# Patient Record
Sex: FEMALE | Race: WHITE | NOT HISPANIC OR LATINO
[De-identification: names, ages, dates, MRNs, and addresses within clinical notes are randomized per-mention and may not be internally consistent; named-entity substitution may affect disease eponyms.]

---

## 2017-01-09 ENCOUNTER — MEDICATION RENEWAL (OUTPATIENT)
Age: 62
End: 2017-01-09

## 2017-01-09 ENCOUNTER — APPOINTMENT (OUTPATIENT)
Dept: FAMILY MEDICINE | Facility: CLINIC | Age: 62
End: 2017-01-09

## 2017-01-09 VITALS
WEIGHT: 136 LBS | HEIGHT: 63 IN | TEMPERATURE: 98.5 F | HEART RATE: 97 BPM | BODY MASS INDEX: 24.1 KG/M2 | DIASTOLIC BLOOD PRESSURE: 80 MMHG | SYSTOLIC BLOOD PRESSURE: 134 MMHG

## 2017-01-09 DIAGNOSIS — Z87.01 PERSONAL HISTORY OF PNEUMONIA (RECURRENT): ICD-10-CM

## 2017-01-10 LAB
25(OH)D3 SERPL-MCNC: 45.8 NG/ML
ALBUMIN SERPL ELPH-MCNC: 4 G/DL
ALP BLD-CCNC: 67 U/L
ALT SERPL-CCNC: 32 U/L
ANION GAP SERPL CALC-SCNC: 15 MMOL/L
AST SERPL-CCNC: 26 U/L
BASOPHILS # BLD AUTO: 0.03 K/UL
BASOPHILS NFR BLD AUTO: 0.3 %
BILIRUB SERPL-MCNC: 0.4 MG/DL
BUN SERPL-MCNC: 13 MG/DL
CALCIUM SERPL-MCNC: 9.1 MG/DL
CHLORIDE SERPL-SCNC: 101 MMOL/L
CHOLEST SERPL-MCNC: 259 MG/DL
CHOLEST/HDLC SERPL: 2.3 RATIO
CO2 SERPL-SCNC: 24 MMOL/L
CREAT SERPL-MCNC: 0.83 MG/DL
EOSINOPHIL # BLD AUTO: 0.04 K/UL
EOSINOPHIL NFR BLD AUTO: 0.5 %
GGT SERPL-CCNC: 12 U/L
GLUCOSE SERPL-MCNC: 91 MG/DL
HBA1C MFR BLD HPLC: 5.7 %
HCT VFR BLD CALC: 38.9 %
HDLC SERPL-MCNC: 114 MG/DL
HGB BLD-MCNC: 13.1 G/DL
IMM GRANULOCYTES NFR BLD AUTO: 0.1 %
LDLC SERPL CALC-MCNC: 135 MG/DL
LYMPHOCYTES # BLD AUTO: 1.45 K/UL
LYMPHOCYTES NFR BLD AUTO: 16.5 %
MAN DIFF?: NORMAL
MCHC RBC-ENTMCNC: 30.3 PG
MCHC RBC-ENTMCNC: 33.7 GM/DL
MCV RBC AUTO: 89.8 FL
MONOCYTES # BLD AUTO: 0.59 K/UL
MONOCYTES NFR BLD AUTO: 6.7 %
NEUTROPHILS # BLD AUTO: 6.65 K/UL
NEUTROPHILS NFR BLD AUTO: 75.9 %
PLATELET # BLD AUTO: 201 K/UL
POTASSIUM SERPL-SCNC: 4.1 MMOL/L
PROT SERPL-MCNC: 7.2 G/DL
RBC # BLD: 4.33 M/UL
RBC # FLD: 13.1 %
SODIUM SERPL-SCNC: 140 MMOL/L
T3 SERPL-MCNC: 85 NG/DL
T4 SERPL-MCNC: 6.4 UG/DL
TRIGL SERPL-MCNC: 49 MG/DL
TSH SERPL-ACNC: 3.19 UU/ML
URATE SERPL-MCNC: 4.1 MG/DL
WBC # FLD AUTO: 8.77 K/UL

## 2017-01-16 ENCOUNTER — APPOINTMENT (OUTPATIENT)
Dept: FAMILY MEDICINE | Facility: CLINIC | Age: 62
End: 2017-01-16

## 2017-01-16 VITALS
HEART RATE: 96 BPM | WEIGHT: 136 LBS | SYSTOLIC BLOOD PRESSURE: 130 MMHG | HEIGHT: 63 IN | TEMPERATURE: 98.3 F | DIASTOLIC BLOOD PRESSURE: 80 MMHG | BODY MASS INDEX: 24.1 KG/M2

## 2017-02-01 ENCOUNTER — APPOINTMENT (OUTPATIENT)
Dept: FAMILY MEDICINE | Facility: CLINIC | Age: 62
End: 2017-02-01

## 2017-02-01 VITALS
OXYGEN SATURATION: 98 % | TEMPERATURE: 98.3 F | WEIGHT: 139 LBS | HEIGHT: 63 IN | BODY MASS INDEX: 24.63 KG/M2 | SYSTOLIC BLOOD PRESSURE: 130 MMHG | HEART RATE: 84 BPM | DIASTOLIC BLOOD PRESSURE: 80 MMHG

## 2017-02-01 RX ORDER — CLARITHROMYCIN 500 MG/1
500 TABLET, FILM COATED ORAL
Qty: 20 | Refills: 0 | Status: COMPLETED | COMMUNITY
Start: 2017-01-09 | End: 2017-02-01

## 2017-02-16 ENCOUNTER — APPOINTMENT (OUTPATIENT)
Dept: FAMILY MEDICINE | Facility: CLINIC | Age: 62
End: 2017-02-16

## 2017-02-16 VITALS
HEART RATE: 100 BPM | HEIGHT: 63 IN | DIASTOLIC BLOOD PRESSURE: 70 MMHG | SYSTOLIC BLOOD PRESSURE: 122 MMHG | WEIGHT: 135 LBS | BODY MASS INDEX: 23.92 KG/M2

## 2017-02-16 RX ORDER — AMOXICILLIN 500 MG/1
500 CAPSULE ORAL
Qty: 7 | Refills: 0 | Status: DISCONTINUED | COMMUNITY
Start: 2017-02-01 | End: 2017-02-16

## 2017-02-27 ENCOUNTER — RX RENEWAL (OUTPATIENT)
Age: 62
End: 2017-02-27

## 2017-03-14 PROBLEM — Z87.898 HISTORY OF TACHYCARDIA: Status: ACTIVE | Noted: 2017-01-09

## 2017-03-20 ENCOUNTER — RX RENEWAL (OUTPATIENT)
Age: 62
End: 2017-03-20

## 2017-03-29 ENCOUNTER — RESULT REVIEW (OUTPATIENT)
Age: 62
End: 2017-03-29

## 2017-05-04 ENCOUNTER — OUTPATIENT (OUTPATIENT)
Dept: OUTPATIENT SERVICES | Facility: HOSPITAL | Age: 62
LOS: 1 days | End: 2017-05-04
Payer: COMMERCIAL

## 2017-05-04 ENCOUNTER — APPOINTMENT (OUTPATIENT)
Dept: MAMMOGRAPHY | Facility: CLINIC | Age: 62
End: 2017-05-04

## 2017-05-04 ENCOUNTER — APPOINTMENT (OUTPATIENT)
Dept: ULTRASOUND IMAGING | Facility: CLINIC | Age: 62
End: 2017-05-04

## 2017-05-04 DIAGNOSIS — Z00.8 ENCOUNTER FOR OTHER GENERAL EXAMINATION: ICD-10-CM

## 2017-05-04 PROCEDURE — 77063 BREAST TOMOSYNTHESIS BI: CPT

## 2017-05-04 PROCEDURE — 76641 ULTRASOUND BREAST COMPLETE: CPT

## 2017-05-04 PROCEDURE — 77067 SCR MAMMO BI INCL CAD: CPT

## 2017-05-11 DIAGNOSIS — Z12.31 ENCOUNTER FOR SCREENING MAMMOGRAM FOR MALIGNANT NEOPLASM OF BREAST: ICD-10-CM

## 2017-05-11 DIAGNOSIS — R92.2 INCONCLUSIVE MAMMOGRAM: ICD-10-CM

## 2017-05-11 DIAGNOSIS — Z80.3 FAMILY HISTORY OF MALIGNANT NEOPLASM OF BREAST: ICD-10-CM

## 2017-07-25 ENCOUNTER — MEDICATION RENEWAL (OUTPATIENT)
Age: 62
End: 2017-07-25

## 2017-07-28 ENCOUNTER — RX RENEWAL (OUTPATIENT)
Age: 62
End: 2017-07-28

## 2017-08-17 ENCOUNTER — APPOINTMENT (OUTPATIENT)
Dept: FAMILY MEDICINE | Facility: CLINIC | Age: 62
End: 2017-08-17
Payer: COMMERCIAL

## 2017-08-17 VITALS
SYSTOLIC BLOOD PRESSURE: 120 MMHG | DIASTOLIC BLOOD PRESSURE: 72 MMHG | BODY MASS INDEX: 24.27 KG/M2 | HEIGHT: 63 IN | WEIGHT: 137 LBS | TEMPERATURE: 98.2 F

## 2017-08-17 PROCEDURE — 99214 OFFICE O/P EST MOD 30 MIN: CPT

## 2017-08-17 RX ORDER — AZITHROMYCIN 500 MG/1
500 TABLET, FILM COATED ORAL DAILY
Qty: 7 | Refills: 0 | Status: DISCONTINUED | COMMUNITY
Start: 2017-02-01 | End: 2017-08-17

## 2017-08-17 RX ORDER — LEVOCETIRIZINE DIHYDROCHLORIDE 5 MG/1
5 TABLET ORAL DAILY
Qty: 30 | Refills: 2 | Status: COMPLETED | COMMUNITY
Start: 2017-02-01 | End: 2017-08-17

## 2017-08-28 ENCOUNTER — APPOINTMENT (OUTPATIENT)
Dept: FAMILY MEDICINE | Facility: CLINIC | Age: 62
End: 2017-08-28
Payer: COMMERCIAL

## 2017-08-28 VITALS
HEART RATE: 96 BPM | BODY MASS INDEX: 24.1 KG/M2 | OXYGEN SATURATION: 96 % | HEIGHT: 63 IN | DIASTOLIC BLOOD PRESSURE: 90 MMHG | SYSTOLIC BLOOD PRESSURE: 134 MMHG | TEMPERATURE: 98.7 F | WEIGHT: 136 LBS

## 2017-08-28 PROCEDURE — 99214 OFFICE O/P EST MOD 30 MIN: CPT

## 2017-12-13 ENCOUNTER — MEDICATION RENEWAL (OUTPATIENT)
Age: 62
End: 2017-12-13

## 2018-03-23 ENCOUNTER — APPOINTMENT (OUTPATIENT)
Dept: FAMILY MEDICINE | Facility: CLINIC | Age: 63
End: 2018-03-23
Payer: COMMERCIAL

## 2018-03-23 VITALS
HEIGHT: 63 IN | SYSTOLIC BLOOD PRESSURE: 122 MMHG | HEART RATE: 88 BPM | OXYGEN SATURATION: 97 % | DIASTOLIC BLOOD PRESSURE: 72 MMHG | BODY MASS INDEX: 24.1 KG/M2 | WEIGHT: 136 LBS | TEMPERATURE: 98.5 F

## 2018-03-23 PROCEDURE — 99214 OFFICE O/P EST MOD 30 MIN: CPT

## 2018-03-23 RX ORDER — SULFAMETHOXAZOLE AND TRIMETHOPRIM 800; 160 MG/1; MG/1
800-160 TABLET ORAL TWICE DAILY
Qty: 20 | Refills: 0 | Status: DISCONTINUED | COMMUNITY
Start: 2017-08-29 | End: 2018-03-23

## 2018-03-23 RX ORDER — CLARITHROMYCIN 500 MG/1
500 TABLET, FILM COATED ORAL
Qty: 20 | Refills: 0 | Status: DISCONTINUED | COMMUNITY
Start: 2017-08-17 | End: 2018-03-23

## 2018-03-23 RX ORDER — METHYLPREDNISOLONE 4 MG/1
4 TABLET ORAL
Qty: 21 | Refills: 0 | Status: DISCONTINUED | COMMUNITY
Start: 2017-08-29 | End: 2018-03-23

## 2018-03-23 RX ORDER — IPRATROPIUM BROMIDE 42 UG/1
0.06 SPRAY NASAL
Qty: 15 | Refills: 0 | Status: COMPLETED | COMMUNITY
Start: 2018-01-11

## 2018-03-23 RX ORDER — LEVOCETIRIZINE DIHYDROCHLORIDE 5 MG/1
5 TABLET ORAL DAILY
Qty: 30 | Refills: 5 | Status: COMPLETED | COMMUNITY
Start: 2017-08-17 | End: 2018-03-23

## 2018-03-23 RX ORDER — DOXYCYCLINE 100 MG/1
100 CAPSULE ORAL
Qty: 20 | Refills: 0 | Status: COMPLETED | COMMUNITY
Start: 2018-01-13

## 2018-03-30 ENCOUNTER — NON-APPOINTMENT (OUTPATIENT)
Age: 63
End: 2018-03-30

## 2018-03-30 ENCOUNTER — OUTPATIENT (OUTPATIENT)
Dept: OUTPATIENT SERVICES | Facility: HOSPITAL | Age: 63
LOS: 1 days | End: 2018-03-30
Payer: COMMERCIAL

## 2018-03-30 ENCOUNTER — APPOINTMENT (OUTPATIENT)
Dept: FAMILY MEDICINE | Facility: CLINIC | Age: 63
End: 2018-03-30
Payer: COMMERCIAL

## 2018-03-30 ENCOUNTER — APPOINTMENT (OUTPATIENT)
Dept: RADIOLOGY | Facility: CLINIC | Age: 63
End: 2018-03-30
Payer: COMMERCIAL

## 2018-03-30 VITALS
SYSTOLIC BLOOD PRESSURE: 120 MMHG | WEIGHT: 133 LBS | BODY MASS INDEX: 23.57 KG/M2 | OXYGEN SATURATION: 98 % | DIASTOLIC BLOOD PRESSURE: 76 MMHG | TEMPERATURE: 97.9 F | HEART RATE: 101 BPM | HEIGHT: 63 IN

## 2018-03-30 DIAGNOSIS — Z00.8 ENCOUNTER FOR OTHER GENERAL EXAMINATION: ICD-10-CM

## 2018-03-30 PROCEDURE — 99214 OFFICE O/P EST MOD 30 MIN: CPT

## 2018-03-30 PROCEDURE — 71046 X-RAY EXAM CHEST 2 VIEWS: CPT

## 2018-03-30 PROCEDURE — 71046 X-RAY EXAM CHEST 2 VIEWS: CPT | Mod: 26

## 2018-04-03 ENCOUNTER — RESULT REVIEW (OUTPATIENT)
Age: 63
End: 2018-04-03

## 2018-04-19 ENCOUNTER — RX RENEWAL (OUTPATIENT)
Age: 63
End: 2018-04-19

## 2018-04-25 ENCOUNTER — RX RENEWAL (OUTPATIENT)
Age: 63
End: 2018-04-25

## 2018-05-11 ENCOUNTER — APPOINTMENT (OUTPATIENT)
Dept: MAMMOGRAPHY | Facility: CLINIC | Age: 63
End: 2018-05-11
Payer: COMMERCIAL

## 2018-05-11 ENCOUNTER — OUTPATIENT (OUTPATIENT)
Dept: OUTPATIENT SERVICES | Facility: HOSPITAL | Age: 63
LOS: 1 days | End: 2018-05-11
Payer: COMMERCIAL

## 2018-05-11 ENCOUNTER — APPOINTMENT (OUTPATIENT)
Dept: ULTRASOUND IMAGING | Facility: CLINIC | Age: 63
End: 2018-05-11
Payer: COMMERCIAL

## 2018-05-11 DIAGNOSIS — Z00.8 ENCOUNTER FOR OTHER GENERAL EXAMINATION: ICD-10-CM

## 2018-05-11 PROCEDURE — 76641 ULTRASOUND BREAST COMPLETE: CPT

## 2018-05-11 PROCEDURE — 76641 ULTRASOUND BREAST COMPLETE: CPT | Mod: 26,50

## 2018-05-11 PROCEDURE — 77063 BREAST TOMOSYNTHESIS BI: CPT

## 2018-05-11 PROCEDURE — 77067 SCR MAMMO BI INCL CAD: CPT

## 2018-05-11 PROCEDURE — 77063 BREAST TOMOSYNTHESIS BI: CPT | Mod: 26

## 2018-05-11 PROCEDURE — 77067 SCR MAMMO BI INCL CAD: CPT | Mod: 26

## 2018-06-11 ENCOUNTER — APPOINTMENT (OUTPATIENT)
Dept: FAMILY MEDICINE | Facility: CLINIC | Age: 63
End: 2018-06-11
Payer: COMMERCIAL

## 2018-06-11 ENCOUNTER — OTHER (OUTPATIENT)
Age: 63
End: 2018-06-11

## 2018-06-11 PROCEDURE — 99214 OFFICE O/P EST MOD 30 MIN: CPT

## 2018-06-11 RX ORDER — DOXYCYCLINE HYCLATE 100 MG/1
100 CAPSULE ORAL TWICE DAILY
Qty: 20 | Refills: 0 | Status: COMPLETED | COMMUNITY
Start: 2018-03-30 | End: 2018-06-11

## 2018-06-11 RX ORDER — METHYLPREDNISOLONE 4 MG/1
4 TABLET ORAL
Qty: 1 | Refills: 0 | Status: COMPLETED | COMMUNITY
Start: 2018-03-30 | End: 2018-06-11

## 2018-06-11 RX ORDER — SODIUM PICOSULFATE, MAGNESIUM OXIDE, AND ANHYDROUS CITRIC ACID 10; 3.5; 12 MG/160ML; G/160ML; G/160ML
10-3.5-12 MG-GM LIQUID ORAL
Qty: 320 | Refills: 0 | Status: COMPLETED | COMMUNITY
Start: 2018-04-04

## 2018-06-12 ENCOUNTER — RESULT CHARGE (OUTPATIENT)
Age: 63
End: 2018-06-12

## 2018-06-12 VITALS — DIASTOLIC BLOOD PRESSURE: 70 MMHG | SYSTOLIC BLOOD PRESSURE: 122 MMHG | HEART RATE: 72 BPM

## 2018-06-12 NOTE — HISTORY OF PRESENT ILLNESS
[FreeTextEntry8] : 61 yo female c nearly 2 week hx of persistent cough.  pt states went to [facility] twice over this time period, last visit was on 6/5/18\par rx'ed Z-chato x 1,   Benzonate 100mg prn, and Prednisone 60mg  x 1 day and then 40mg days 2-5.  \par \par pt reports DECREASED productive cough now c yellow phlegm, originally green.  pt states cough is persistent.  denies any worsening at night.   no f/c/s  mild SOB,  +chest tightness and burning

## 2018-06-12 NOTE — PHYSICAL EXAM
[No Acute Distress] : no acute distress [Well Developed] : well developed [Well-Appearing] : well-appearing [EOMI] : extraocular movements intact [No JVD] : no jugular venous distention [Supple] : supple [No Lymphadenopathy] : no lymphadenopathy [Normal Rate] : normal rate  [Regular Rhythm] : with a regular rhythm [Normal S1, S2] : normal S1 and S2 [Normal Posterior Cervical Nodes] : no posterior cervical lymphadenopathy [Normal Anterior Cervical Nodes] : no anterior cervical lymphadenopathy [No CVA Tenderness] : no CVA  tenderness [No Spinal Tenderness] : no spinal tenderness [Grossly Normal Strength/Tone] : grossly normal strength/tone [No Rash] : no rash [Coordination Grossly Intact] : coordination grossly intact [No Focal Deficits] : no focal deficits [Normal Affect] : the affect was normal [Normal Mood] : the mood was normal [Normal Insight/Judgement] : insight and judgment were intact [de-identified] : mild PND w/o erythema post pharynx minimally dull TMs B  [de-identified] : decreased clear BS B /L

## 2018-06-12 NOTE — REVIEW OF SYSTEMS
[Fatigue] : fatigue [Postnasal Drip] : postnasal drip [Shortness Of Breath] : shortness of breath [Cough] : cough [Negative] : Heme/Lymph [FreeTextEntry2] : see HPI [FreeTextEntry4] : see HPI [FreeTextEntry6] : see HPI

## 2018-06-12 NOTE — ASSESSMENT
[FreeTextEntry1] : cont all meds  \par \par see med orders \par \par aggressive hydration!!!!!!\par \par \par f/u 72 hrs c phone call

## 2018-06-22 ENCOUNTER — APPOINTMENT (OUTPATIENT)
Dept: FAMILY MEDICINE | Facility: CLINIC | Age: 63
End: 2018-06-22
Payer: COMMERCIAL

## 2018-06-22 VITALS
HEIGHT: 63 IN | BODY MASS INDEX: 23.74 KG/M2 | WEIGHT: 134 LBS | HEART RATE: 94 BPM | OXYGEN SATURATION: 96 % | DIASTOLIC BLOOD PRESSURE: 72 MMHG | SYSTOLIC BLOOD PRESSURE: 112 MMHG

## 2018-06-22 PROCEDURE — 99214 OFFICE O/P EST MOD 30 MIN: CPT

## 2018-06-22 RX ORDER — BENZONATATE 100 MG/1
100 CAPSULE ORAL
Qty: 30 | Refills: 0 | Status: COMPLETED | COMMUNITY
Start: 2018-03-30 | End: 2018-06-22

## 2018-06-22 RX ORDER — METHYLPREDNISOLONE 4 MG/1
4 TABLET ORAL
Qty: 21 | Refills: 0 | Status: COMPLETED | COMMUNITY
Start: 2018-06-11 | End: 2018-06-22

## 2018-06-22 NOTE — PHYSICAL EXAM
[No Acute Distress] : no acute distress [Well Nourished] : well nourished [Well Developed] : well developed [Well-Appearing] : well-appearing [PERRL] : pupils equal round and reactive to light [EOMI] : extraocular movements intact [No JVD] : no jugular venous distention [Supple] : supple [No Lymphadenopathy] : no lymphadenopathy [No Respiratory Distress] : no respiratory distress  [Clear to Auscultation] : lungs were clear to auscultation bilaterally [No Accessory Muscle Use] : no accessory muscle use [Normal Rate] : normal rate  [Regular Rhythm] : with a regular rhythm [Normal S1, S2] : normal S1 and S2 [Soft] : abdomen soft [Non Tender] : non-tender [Non-distended] : non-distended [No HSM] : no HSM [Normal Posterior Cervical Nodes] : no posterior cervical lymphadenopathy [Normal Anterior Cervical Nodes] : no anterior cervical lymphadenopathy [No Spinal Tenderness] : no spinal tenderness [Grossly Normal Strength/Tone] : grossly normal strength/tone [No Rash] : no rash [Normal Gait] : normal gait [Coordination Grossly Intact] : coordination grossly intact [No Focal Deficits] : no focal deficits [Normal Affect] : the affect was normal [Normal Mood] : the mood was normal [Normal Insight/Judgement] : insight and judgment were intact [de-identified] : +PND post pharynx, dull TMs B

## 2018-06-22 NOTE — ASSESSMENT
[FreeTextEntry1] : cont Singulair, cont Flonase, cont Astepro cont Spiriva 2 puffs daily \par \par trial of Symbicort 160/4.5 2 puffs bid \par \par see radiology orders

## 2018-06-22 NOTE — HISTORY OF PRESENT ILLNESS
[FreeTextEntry1] : Patient here for follow up on cough and congestion. Patient c/o headache X 5 days  [de-identified] : 63 yo female presents to office for f/u on  cough s/p MDP x 1 and Promethazine 1 tsp q 6h.  pt states felt better on Day 2 of MDP,  however, symptoms returned 2 days post completion of MDP x 1.  Sx and Sx decreased in frequency and intensity, but persistent.\par \par no fever/chills/sweats  +B/L temporal H/a.  +mild nasal congestion c "bright yellow" mucous x 3 , +PND  no ear pain B/L

## 2018-06-22 NOTE — REVIEW OF SYSTEMS
[Fatigue] : fatigue [Cough] : cough [Negative] : Heme/Lymph [FreeTextEntry2] : see HPI  [FreeTextEntry6] : see HPI

## 2018-06-25 ENCOUNTER — MEDICATION RENEWAL (OUTPATIENT)
Age: 63
End: 2018-06-25

## 2018-07-05 ENCOUNTER — MEDICATION RENEWAL (OUTPATIENT)
Age: 63
End: 2018-07-05

## 2018-07-20 ENCOUNTER — APPOINTMENT (OUTPATIENT)
Dept: FAMILY MEDICINE | Facility: CLINIC | Age: 63
End: 2018-07-20
Payer: COMMERCIAL

## 2018-07-20 VITALS
HEIGHT: 63 IN | OXYGEN SATURATION: 98 % | SYSTOLIC BLOOD PRESSURE: 112 MMHG | DIASTOLIC BLOOD PRESSURE: 80 MMHG | HEART RATE: 96 BPM | WEIGHT: 134 LBS | TEMPERATURE: 98.2 F | BODY MASS INDEX: 23.74 KG/M2

## 2018-07-20 DIAGNOSIS — R91.8 OTHER NONSPECIFIC ABNORMAL FINDING OF LUNG FIELD: ICD-10-CM

## 2018-07-20 PROCEDURE — 99214 OFFICE O/P EST MOD 30 MIN: CPT

## 2018-07-20 RX ORDER — LEVOFLOXACIN 500 MG/1
500 TABLET, FILM COATED ORAL DAILY
Qty: 10 | Refills: 0 | Status: DISCONTINUED | COMMUNITY
Start: 2018-06-25 | End: 2018-07-20

## 2018-10-08 ENCOUNTER — APPOINTMENT (OUTPATIENT)
Dept: FAMILY MEDICINE | Facility: CLINIC | Age: 63
End: 2018-10-08
Payer: COMMERCIAL

## 2018-10-08 VITALS
TEMPERATURE: 98.4 F | SYSTOLIC BLOOD PRESSURE: 134 MMHG | HEIGHT: 63 IN | HEART RATE: 107 BPM | BODY MASS INDEX: 23.39 KG/M2 | WEIGHT: 132 LBS | DIASTOLIC BLOOD PRESSURE: 82 MMHG | OXYGEN SATURATION: 98 %

## 2018-10-08 DIAGNOSIS — Z87.09 PERSONAL HISTORY OF OTHER DISEASES OF THE RESPIRATORY SYSTEM: ICD-10-CM

## 2018-10-08 DIAGNOSIS — Z87.898 PERSONAL HISTORY OF OTHER SPECIFIED CONDITIONS: ICD-10-CM

## 2018-10-08 DIAGNOSIS — T75.3XXA MOTION SICKNESS, INITIAL ENCOUNTER: ICD-10-CM

## 2018-10-08 PROCEDURE — 99396 PREV VISIT EST AGE 40-64: CPT | Mod: 25

## 2018-10-08 PROCEDURE — G0008: CPT

## 2018-10-08 PROCEDURE — 90686 IIV4 VACC NO PRSV 0.5 ML IM: CPT

## 2018-10-08 PROCEDURE — 36415 COLL VENOUS BLD VENIPUNCTURE: CPT

## 2018-10-08 PROCEDURE — 93000 ELECTROCARDIOGRAM COMPLETE: CPT

## 2018-10-08 RX ORDER — PROMETHAZINE HYDROCHLORIDE AND DEXTROMETHORPHAN HYDROBROMIDE ORAL SOLUTION 15; 6.25 MG/5ML; MG/5ML
6.25-15 SOLUTION ORAL
Qty: 180 | Refills: 0 | Status: DISCONTINUED | COMMUNITY
Start: 2018-06-11 | End: 2018-10-08

## 2018-10-08 RX ORDER — BUDESONIDE AND FORMOTEROL FUMARATE DIHYDRATE 160; 4.5 UG/1; UG/1
160-4.5 AEROSOL RESPIRATORY (INHALATION) TWICE DAILY
Qty: 1 | Refills: 3 | Status: DISCONTINUED | COMMUNITY
Start: 2018-06-22 | End: 2018-10-08

## 2018-10-08 RX ORDER — MOXIFLOXACIN HYDROCHLORIDE TABLETS, 400 MG 400 MG/1
400 TABLET, FILM COATED ORAL DAILY
Qty: 10 | Refills: 0 | Status: DISCONTINUED | COMMUNITY
Start: 2018-07-20 | End: 2018-10-08

## 2018-10-08 NOTE — HEALTH RISK ASSESSMENT
[Patient reported mammogram was normal] : Patient reported mammogram was normal [Patient reported PAP Smear was normal] : Patient reported PAP Smear was normal [Patient reported bone density results were normal] : Patient reported bone density results were normal [Patient reported colonoscopy was normal] : Patient reported colonoscopy was normal [MammogramDate] : 05/2018 [MammogramComments] : Tressa Otero [PapSmearDate] : 03/2018 [PapSmearComments] : Dr. Pedro [BoneDensityDate] : 04/2016 [BoneDensityComments] : [facility] [ColonoscopyDate] : 10/2013 [ColonoscopyComments] : Dr. Nicole

## 2018-10-08 NOTE — PLAN
[FreeTextEntry1] : again recommend ent consult for chronic sinus disease\par patient to ask allergist for recommendation

## 2018-10-08 NOTE — PHYSICAL EXAM

## 2018-10-08 NOTE — HISTORY OF PRESENT ILLNESS
[FreeTextEntry1] : CPE [de-identified] : has lingular pneumonia in early summer\par treated with antibiotics but had persistent fever\par in july there was no resulotion on xray so had ct scan which showed pneumonia (mulitple spots)\par was at admitted to [facility] under Dr. Ramsey and was admited for three days\par she has felt fine ever since\par follow up CT showed resolution of symtpoms\par aout a week ago she started taking her allergy medication\par and the symbicort\par gets ivig monthly\par \par \par \par

## 2018-10-09 LAB
25(OH)D3 SERPL-MCNC: 59.1 NG/ML
ALBUMIN SERPL ELPH-MCNC: 4.4 G/DL
ALP BLD-CCNC: 83 U/L
ALT SERPL-CCNC: 20 U/L
ANION GAP SERPL CALC-SCNC: 13 MMOL/L
APPEARANCE: CLEAR
AST SERPL-CCNC: 25 U/L
BACTERIA: NEGATIVE
BASOPHILS # BLD AUTO: 0.04 K/UL
BASOPHILS NFR BLD AUTO: 1.1 %
BILIRUB SERPL-MCNC: 0.2 MG/DL
BILIRUBIN URINE: NEGATIVE
BLOOD URINE: NEGATIVE
BUN SERPL-MCNC: 15 MG/DL
CALCIUM SERPL-MCNC: 10.4 MG/DL
CHLORIDE SERPL-SCNC: 99 MMOL/L
CHOLEST SERPL-MCNC: 208 MG/DL
CHOLEST/HDLC SERPL: 2.2 RATIO
CO2 SERPL-SCNC: 30 MMOL/L
COLOR: YELLOW
CREAT SERPL-MCNC: 0.86 MG/DL
EOSINOPHIL # BLD AUTO: 0.05 K/UL
EOSINOPHIL NFR BLD AUTO: 1.4 %
GLUCOSE QUALITATIVE U: NEGATIVE MG/DL
GLUCOSE SERPL-MCNC: 93 MG/DL
HBA1C MFR BLD HPLC: 5.5 %
HCT VFR BLD CALC: 40.4 %
HCV AB SER QL: NONREACTIVE
HCV S/CO RATIO: 0.16 S/CO
HDLC SERPL-MCNC: 94 MG/DL
HGB BLD-MCNC: 13 G/DL
IMM GRANULOCYTES NFR BLD AUTO: 0 %
KETONES URINE: NEGATIVE
LDLC SERPL CALC-MCNC: 106 MG/DL
LEUKOCYTE ESTERASE URINE: NEGATIVE
LYMPHOCYTES # BLD AUTO: 1.42 K/UL
LYMPHOCYTES NFR BLD AUTO: 40.1 %
MAN DIFF?: NORMAL
MCHC RBC-ENTMCNC: 29.1 PG
MCHC RBC-ENTMCNC: 32.2 GM/DL
MCV RBC AUTO: 90.4 FL
MICROSCOPIC-UA: NORMAL
MONOCYTES # BLD AUTO: 0.29 K/UL
MONOCYTES NFR BLD AUTO: 8.2 %
NEUTROPHILS # BLD AUTO: 1.74 K/UL
NEUTROPHILS NFR BLD AUTO: 49.2 %
NITRITE URINE: NEGATIVE
PH URINE: 7.5
PLATELET # BLD AUTO: 340 K/UL
POTASSIUM SERPL-SCNC: 4.7 MMOL/L
PROT SERPL-MCNC: 7.6 G/DL
PROTEIN URINE: NEGATIVE MG/DL
RBC # BLD: 4.47 M/UL
RBC # FLD: 13.1 %
RED BLOOD CELLS URINE: 0 /HPF
SODIUM SERPL-SCNC: 142 MMOL/L
SPECIFIC GRAVITY URINE: 1.01
SQUAMOUS EPITHELIAL CELLS: 0 /HPF
TRIGL SERPL-MCNC: 40 MG/DL
TSH SERPL-ACNC: 2.32 UIU/ML
URATE SERPL-MCNC: 4.1 MG/DL
UROBILINOGEN URINE: NEGATIVE MG/DL
WBC # FLD AUTO: 3.54 K/UL
WHITE BLOOD CELLS URINE: 0 /HPF

## 2018-10-25 ENCOUNTER — APPOINTMENT (OUTPATIENT)
Dept: FAMILY MEDICINE | Facility: CLINIC | Age: 63
End: 2018-10-25
Payer: COMMERCIAL

## 2018-10-25 ENCOUNTER — RX RENEWAL (OUTPATIENT)
Age: 63
End: 2018-10-25

## 2018-10-25 VITALS
SYSTOLIC BLOOD PRESSURE: 126 MMHG | WEIGHT: 129 LBS | HEIGHT: 63 IN | BODY MASS INDEX: 22.86 KG/M2 | DIASTOLIC BLOOD PRESSURE: 74 MMHG | HEART RATE: 95 BPM | OXYGEN SATURATION: 97 % | TEMPERATURE: 97.9 F

## 2018-10-25 PROCEDURE — 99214 OFFICE O/P EST MOD 30 MIN: CPT

## 2018-10-25 NOTE — PHYSICAL EXAM
[No Acute Distress] : no acute distress [Well Nourished] : well nourished [Well Developed] : well developed [Well-Appearing] : well-appearing [EOMI] : extraocular movements intact [No JVD] : no jugular venous distention [No Respiratory Distress] : no respiratory distress  [Clear to Auscultation] : lungs were clear to auscultation bilaterally [No Accessory Muscle Use] : no accessory muscle use [Normal Rate] : normal rate  [Regular Rhythm] : with a regular rhythm [Normal S1, S2] : normal S1 and S2 [No Edema] : there was no peripheral edema [No CVA Tenderness] : no CVA  tenderness [Grossly Normal Strength/Tone] : grossly normal strength/tone [No Rash] : no rash [Normal Gait] : normal gait [Coordination Grossly Intact] : coordination grossly intact [No Focal Deficits] : no focal deficits [Normal Affect] : the affect was normal [Normal Mood] : the mood was normal [Normal Insight/Judgement] : insight and judgment were intact [de-identified] : +PND dull TMs B/L  [de-identified] : +nodes B/L

## 2018-10-25 NOTE — HISTORY OF PRESENT ILLNESS
[FreeTextEntry8] : sinus pressure/PND/cough \par \par 64 yo female s/p ENT consult c Dr. Lin early October,  "scope" performed revealing "clear drainage and likely allergies." \par \par pt c/o 2 day hx of sinus congestion c green rhinorrhea,  no f/c/s  no sore throat, +cough c green phlegm +B/L ear popping \par \par pt currently taking Astelin NS mid day, OTC Claritin, and Singulair qd, Saline Nasal rinse bid

## 2018-11-05 ENCOUNTER — RX RENEWAL (OUTPATIENT)
Age: 63
End: 2018-11-05

## 2018-11-20 ENCOUNTER — APPOINTMENT (OUTPATIENT)
Dept: FAMILY MEDICINE | Facility: CLINIC | Age: 63
End: 2018-11-20
Payer: COMMERCIAL

## 2018-11-20 VITALS
SYSTOLIC BLOOD PRESSURE: 112 MMHG | WEIGHT: 133 LBS | DIASTOLIC BLOOD PRESSURE: 68 MMHG | HEIGHT: 63 IN | OXYGEN SATURATION: 97 % | BODY MASS INDEX: 23.57 KG/M2 | TEMPERATURE: 98.1 F | HEART RATE: 95 BPM

## 2018-11-20 DIAGNOSIS — Z87.09 PERSONAL HISTORY OF OTHER DISEASES OF THE RESPIRATORY SYSTEM: ICD-10-CM

## 2018-11-20 LAB — S PYO AG SPEC QL IA: NEGATIVE

## 2018-11-20 PROCEDURE — 99214 OFFICE O/P EST MOD 30 MIN: CPT | Mod: 25

## 2018-11-20 PROCEDURE — 87880 STREP A ASSAY W/OPTIC: CPT | Mod: QW

## 2018-11-20 RX ORDER — LEVOFLOXACIN 500 MG/1
500 TABLET, FILM COATED ORAL DAILY
Qty: 10 | Refills: 0 | Status: DISCONTINUED | COMMUNITY
Start: 2018-10-25 | End: 2018-11-20

## 2018-11-20 RX ORDER — NYSTATIN 100000 [USP'U]/ML
100000 SUSPENSION ORAL 4 TIMES DAILY
Qty: 1 | Refills: 0 | Status: DISCONTINUED | COMMUNITY
Start: 2018-10-08 | End: 2018-11-20

## 2019-03-26 ENCOUNTER — RX RENEWAL (OUTPATIENT)
Age: 64
End: 2019-03-26

## 2019-04-23 ENCOUNTER — APPOINTMENT (OUTPATIENT)
Dept: PEDIATRIC ALLERGY IMMUNOLOGY | Facility: CLINIC | Age: 64
End: 2019-04-23
Payer: COMMERCIAL

## 2019-04-23 VITALS
DIASTOLIC BLOOD PRESSURE: 86 MMHG | WEIGHT: 130 LBS | HEIGHT: 63 IN | OXYGEN SATURATION: 97 % | HEART RATE: 97 BPM | SYSTOLIC BLOOD PRESSURE: 143 MMHG | BODY MASS INDEX: 23.04 KG/M2

## 2019-04-23 VITALS — HEART RATE: 93 BPM | SYSTOLIC BLOOD PRESSURE: 170 MMHG | DIASTOLIC BLOOD PRESSURE: 100 MMHG

## 2019-04-23 DIAGNOSIS — R76.8 OTHER SPECIFIED ABNORMAL IMMUNOLOGICAL FINDINGS IN SERUM: ICD-10-CM

## 2019-04-23 DIAGNOSIS — J31.0 CHRONIC RHINITIS: ICD-10-CM

## 2019-04-23 PROCEDURE — 99205 OFFICE O/P NEW HI 60 MIN: CPT

## 2019-04-23 RX ORDER — DOXYCYCLINE HYCLATE 100 MG/1
100 CAPSULE ORAL TWICE DAILY
Qty: 20 | Refills: 0 | Status: COMPLETED | COMMUNITY
Start: 2018-11-20 | End: 2019-04-23

## 2019-04-23 RX ORDER — BUDESONIDE AND FORMOTEROL FUMARATE DIHYDRATE 160; 4.5 UG/1; UG/1
160-4.5 AEROSOL RESPIRATORY (INHALATION) TWICE DAILY
Qty: 1 | Refills: 3 | Status: COMPLETED | COMMUNITY
Start: 2018-06-22 | End: 2019-04-23

## 2019-04-23 NOTE — REASON FOR VISIT
[Initial Consultation] : an initial consultation for [Recurrent Infect] : recurrent infections [Immune Evaluation] : immune evaluation

## 2019-04-24 ENCOUNTER — NON-APPOINTMENT (OUTPATIENT)
Age: 64
End: 2019-04-24

## 2019-04-24 ENCOUNTER — RECORD ABSTRACTING (OUTPATIENT)
Age: 64
End: 2019-04-24

## 2019-04-24 ENCOUNTER — APPOINTMENT (OUTPATIENT)
Dept: FAMILY MEDICINE | Facility: CLINIC | Age: 64
End: 2019-04-24
Payer: COMMERCIAL

## 2019-04-24 VITALS
WEIGHT: 134 LBS | OXYGEN SATURATION: 97 % | HEART RATE: 91 BPM | SYSTOLIC BLOOD PRESSURE: 138 MMHG | TEMPERATURE: 98.2 F | HEIGHT: 63 IN | DIASTOLIC BLOOD PRESSURE: 84 MMHG | BODY MASS INDEX: 23.74 KG/M2

## 2019-04-24 VITALS — DIASTOLIC BLOOD PRESSURE: 85 MMHG | SYSTOLIC BLOOD PRESSURE: 142 MMHG

## 2019-04-24 DIAGNOSIS — R00.2 PALPITATIONS: ICD-10-CM

## 2019-04-24 DIAGNOSIS — Z92.89 PERSONAL HISTORY OF OTHER MEDICAL TREATMENT: ICD-10-CM

## 2019-04-24 DIAGNOSIS — N96 RECURRENT PREGNANCY LOSS: ICD-10-CM

## 2019-04-24 DIAGNOSIS — Z82.62 FAMILY HISTORY OF OSTEOPOROSIS: ICD-10-CM

## 2019-04-24 DIAGNOSIS — R03.0 ELEVATED BLOOD-PRESSURE READING, W/OUT DIAGNOSIS OF HYPERTENSION: ICD-10-CM

## 2019-04-24 PROBLEM — R76.8 HIGH TOTAL SERUM IGM: Status: ACTIVE | Noted: 2019-04-23

## 2019-04-24 PROBLEM — J31.0 CHRONIC RHINITIS: Status: ACTIVE | Noted: 2019-04-24

## 2019-04-24 LAB
CYTOLOGY CVX/VAG DOC THIN PREP: NORMAL
CYTOLOGY CVX/VAG DOC THIN PREP: NORMAL

## 2019-04-24 PROCEDURE — 93000 ELECTROCARDIOGRAM COMPLETE: CPT

## 2019-04-24 PROCEDURE — 99214 OFFICE O/P EST MOD 30 MIN: CPT | Mod: 25

## 2019-04-24 NOTE — CONSULT LETTER
[Dear  ___] : Dear  [unfilled], [Consult Letter:] : I had the pleasure of evaluating your patient, [unfilled]. [Please see my note below.] : Please see my note below. [Consult Closing:] : Thank you very much for allowing me to participate in the care of this patient.  If you have any questions, please do not hesitate to contact me. [Sincerely,] : Sincerely, [DrLoreta  ___] : Dr. BARKER [DrLoreta ___] : Dr. BARKER [FreeTextEntry3] : Abundio Rosenthal III  MPH, MD, PhD, FACP, FACAAI, FAAAAI \par , Departments of Medicine and Pediatrics \par [employer] [employer] [facility] \par , [facility] [employer] [facility] [employer] [facility] [employer] [facility] [employer] \par Attending Physician, Division of Allergy & Immunology [facility]\par \par \par

## 2019-04-24 NOTE — HISTORY OF PRESENT ILLNESS
[FreeTextEntry8] : pt c/o feeling jittery, c high bp reading at dr toney office \par \par \par 62 yo female s/p immunology consult yesterday and was told her BP was elevated.  pt states BP was 168/90.   pt states felt jittery yesterday and today.  Denies CP today  chronic STABLE unchanged SOB,  mild lightheadedness   +ve palpitations \par \par pt states had an infusion c pre medicated IV steroid this past Monday 4/22/19

## 2019-04-24 NOTE — PHYSICAL EXAM
[No Acute Distress] : no acute distress [Well Nourished] : well nourished [Well Developed] : well developed [Well-Appearing] : well-appearing [EOMI] : extraocular movements intact [Normal Outer Ear/Nose] : the outer ears and nose were normal in appearance [No JVD] : no jugular venous distention [No Respiratory Distress] : no respiratory distress  [Clear to Auscultation] : lungs were clear to auscultation bilaterally [No Accessory Muscle Use] : no accessory muscle use [Regular Rhythm] : with a regular rhythm [Normal Rate] : normal rate  [Normal S1, S2] : normal S1 and S2 [No Edema] : there was no peripheral edema [Non-distended] : non-distended [No CVA Tenderness] : no CVA  tenderness [Grossly Normal Strength/Tone] : grossly normal strength/tone [No Rash] : no rash [Normal Gait] : normal gait [No Focal Deficits] : no focal deficits [Coordination Grossly Intact] : coordination grossly intact [Normal Affect] : the affect was normal [Normal Mood] : the mood was normal [Normal Insight/Judgement] : insight and judgment were intact

## 2019-04-24 NOTE — PHYSICAL EXAM
[Alert] : alert [Well Nourished] : well nourished [No Acute Distress] : no acute distress [Healthy Appearance] : healthy appearance [Well Developed] : well developed [Normal Pupil & Iris Size/Symmetry] : normal pupil and iris size and symmetry [Sclera Not Icteric] : sclera not icteric [No Photophobia] : no photophobia [No Discharge] : no discharge [Normal Nasal Mucosa] : the nasal mucosa was normal [Normal TMs] : both tympanic membranes were normal [Normal Tonsils] : normal tonsils [Normal Outer Ear/Nose] : the ears and nose were normal in appearance [Normal Lips/Tongue] : the lips and tongue were normal [No Thrush] : no thrush [Normal Dentition] : normal dentition [No Oral Lesions or Ulcers] : no oral lesions or ulcers [No LAD] : no lymphadenopathy [Supple] : the neck was supple [Normal Rate and Effort] : normal respiratory rhythm and effort [Normal Palpation] : palpation of the chest revealed no abnormalities [No Retractions] : no retractions [No Crackles] : no crackles [Bilateral Audible Breath Sounds] : bilateral audible breath sounds [Normal Rate] : heart rate was normal  [No murmur] : no murmur [Normal S1, S2] : normal S1 and S2 [Regular Rhythm] : with a regular rhythm [Soft] : abdomen soft [Not Distended] : not distended [Not Tender] : non-tender [No HSM] : no hepato-splenomegaly [Normal Cervical Lymph Nodes] : cervical [Normal Axillary Lumph Nodes] : axillary [Skin Intact] : skin intact  [No Skin Lesions] : no skin lesions [No Rash] : no rash [No Joint Swelling or Erythema] : no joint swelling or erythema [No Cyanosis] : no cyanosis [No clubbing] : no clubbing [No Edema] : no edema [No Motor Deficits] : the motor exam was normal [Normal Mood] : mood was normal [Normal Affect] : affect was normal [Alert, Awake, Oriented as Age-Appropriate] : alert, awake, oriented as age appropriate [Conjunctival Erythema] : no conjunctival erythema [Suborbital Bogginess] : no suborbital bogginess (allergic shiners) [Exudate] : no exudate [Pharyngeal erythema] : no pharyngeal erythema [Boggy Nasal Turbinates] : no boggy and/or pale nasal turbinates [Clear Rhinorrhea] : no clear rhinorrhea was seen [Posterior Pharyngeal Cobblestoning] : no posterior pharyngeal cobblestoning [Wheezing] : no wheezing was heard [Eczematous Patches] : no eczematous patches [Xerosis] : no xerosis [de-identified] : no papilledema

## 2019-04-24 NOTE — SOCIAL HISTORY
[Spouse/Partner] : spouse/partner [College] : College [House] : [unfilled] lives in a house  [Radiator/Baseboard] : heating provided by radiator(s)/baseboard(s) [Length of Occupancy (yrs)___] : the length of occupancy is [unfilled] years [Central] : air conditioning provided by central unit [Dehumidifier] : uses a dehumidifier [Dry] : dry [Basement] :  in basement  [Dog] : dog [] :  [FreeTextEntry2] :  [Humidifier] : does not use a humidifier [Feather Pillows] : does not have feather pillows [Cockroaches] : Patient states that there are no cockroaches in the home [Dust Mite Covers] : does not have dust mite covers [Feather Comforter] : does not have a feather comforter [Living Area] : not in the living area [Smokers in Household] : there are no smokers in the home

## 2019-04-24 NOTE — REVIEW OF SYSTEMS
[Fever] : fever [Headache] : headache [Recurrent Pneumonia] : ~T recurrent pneumonia [Nl] : Genitourinary [Received Influenza Vaccine this Past Year] : patient has received the Influenza vaccine this past year [Rhinorrhea] : no rhinorrhea [Nasal Congestion] : no nasal congestion [Sneezing] : no sneezing [Nocturnal Awakening] : no nocturnal awakening with shortness of breath [Wheezing] : no wheezing [SOB with Exertion] : no dyspnea on exertion [de-identified] : see hpi [Immunizations are up to date] : Immunizations are not up to date

## 2019-04-24 NOTE — ASSESSMENT
[FreeTextEntry1] : aggressive hydration!!!!! \par \par decrease caffeine intake by 50% \par \par EKG performed:  \par \par \par REassurance

## 2019-04-24 NOTE — HISTORY OF PRESENT ILLNESS
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 Referred by Dr. Cooper.\par \par She was healthy as a child, but started getting "sick" 15 yrs ago. she is a .  Last year she had 4 separate episodes of PNA, but but on average she has 1 a year. Per patient, these PNAs are almost always confirmed by imaging, and she is always given Abx. She has only been hospitalized once (last year) for IV Abx to treat PNA. She has been on IVIG with hematology for about 2 yrs, and her dose was increased to Gammagard 60gm q 3 weeks in Jan 2019. Last infusion was 4/22/19.\par \par As younger adult, she had frequent sinusitis that also required Abx. She would get vaginal yeast infections after these course of Abx back then, but not so much now. More recently,  she developed oral thrush while using symbicort. She was treated with po antifungal and has since stopped Symbicort. She maybe takes 2 course of oral steroids (medrol dose packs)  twice a  year and denies taking any other immunosuppressive medications. \par \par Pt states that autoimmunity work up done while hospitalized in july 2018 was negative but she was on IVIG at the time, so the results do not necessarily reflect her own titers.\par \par Per chart notes, in 5/2015, hematology note states she had mildly elevated IgM levels with recurrent upper respiratory infections. There was a faint band of immunofixation IgM and kappa.  Labs from 1/27/16 show IgG = 874 with normal subclasses and normal IgA. In 10/2016, quantitative IgS and subclasses where reportedly wnl. she was already on IVIG and doing well\par Labs from 3/2018 showed 13 of 23 borderline protective titers while on IVIG, IgG=1229 with normal subsets, elevated IgM =443 and unremarkable IgA, unremarkable C3, C4, elevated CH50. CBC showed low WBC = 3.1 with otherwise unremarkable T, B, NK cell counts.\par atypical ANCAs, c-ANCA and p-ANCA titers were negative (while on IVIG). ImmunoCAP to common aeroallergens was negative\par \par CBC from 10/8/18 showed decreased WBC = 3.54 (3.80 - 10.5) with mildly decreased ANC = 1.74 (1.80-7.4)\par 4/17/19 WBC = 3.5 (3.4-10.8), ANC = 1.5 (1.4-7.0)\par 4/19/19 WBC = 3.0 (4.1-11.0), ANC = 1.362 (45.4%, 60-80%)\par 4/22/19 WBC = 3.3 (4-10.5), NC - 1.6 (1.5-6.5)\par note that these CBCs were all done in different labs\par \par Since 4/1/19, she has been keeping  a fever log per Dr. Cooper's suggestion. Tmax was 100.5 on 4/1 and 4/2 but afebrile since. Notes on fever log indicate she had chest xray on 4/5 was told she had LEFT LINGULA infiltrate and was started on 7 days of levaquin. \par \par Last infusion was 4/22/19. Since then she has had a headache and poor sleep. BP today is elevated. She denies vision changes, worsening of headache, other complaints. She does not have a history of elevated BP per report or in EMR.\par \par For rhinitis, she uses Flonase and singulair with good control of nasal complaints.\par \par For her shortness of breath, she stopped symbicor because pulmonary Dr. Ramsey didn't think she had asthma. She is still on Singulair and Spiriva and albuterol PRN.

## 2019-04-25 ENCOUNTER — APPOINTMENT (OUTPATIENT)
Dept: OBGYN | Facility: CLINIC | Age: 64
End: 2019-04-25
Payer: COMMERCIAL

## 2019-04-25 ENCOUNTER — APPOINTMENT (OUTPATIENT)
Dept: FAMILY MEDICINE | Facility: CLINIC | Age: 64
End: 2019-04-25
Payer: COMMERCIAL

## 2019-04-25 VITALS
WEIGHT: 132 LBS | HEIGHT: 63 IN | DIASTOLIC BLOOD PRESSURE: 80 MMHG | BODY MASS INDEX: 23.39 KG/M2 | SYSTOLIC BLOOD PRESSURE: 136 MMHG | OXYGEN SATURATION: 97 % | HEART RATE: 84 BPM

## 2019-04-25 VITALS
WEIGHT: 133 LBS | BODY MASS INDEX: 23.57 KG/M2 | DIASTOLIC BLOOD PRESSURE: 87 MMHG | HEIGHT: 63 IN | SYSTOLIC BLOOD PRESSURE: 148 MMHG

## 2019-04-25 VITALS — DIASTOLIC BLOOD PRESSURE: 80 MMHG | SYSTOLIC BLOOD PRESSURE: 120 MMHG

## 2019-04-25 DIAGNOSIS — K21.9 GASTRO-ESOPHAGEAL REFLUX DISEASE W/OUT ESOPHAGITIS: ICD-10-CM

## 2019-04-25 DIAGNOSIS — Z86.59 PERSONAL HISTORY OF OTHER MENTAL AND BEHAVIORAL DISORDERS: ICD-10-CM

## 2019-04-25 DIAGNOSIS — Z80.3 FAMILY HISTORY OF MALIGNANT NEOPLASM OF BREAST: ICD-10-CM

## 2019-04-25 PROCEDURE — 81003 URINALYSIS AUTO W/O SCOPE: CPT | Mod: QW

## 2019-04-25 PROCEDURE — 99214 OFFICE O/P EST MOD 30 MIN: CPT

## 2019-04-25 PROCEDURE — 99396 PREV VISIT EST AGE 40-64: CPT

## 2019-04-25 PROCEDURE — 82270 OCCULT BLOOD FECES: CPT

## 2019-04-25 RX ORDER — ESTRADIOL 0.1 MG/G
0.1 CREAM VAGINAL
Refills: 0 | Status: COMPLETED | COMMUNITY
End: 2019-04-25

## 2019-04-25 NOTE — END OF VISIT
[FreeTextEntry3] : Medical record entries made by the scribe today today, were at my direction and personally dictated to them by me, Dr. Merced Haro on Apr 25, 2019. I have reviewed the chart and agree that the record accurately reflects my personal performance of the history, physical exam, assessment, and plan.

## 2019-04-25 NOTE — ADDENDUM
[FreeTextEntry1] : I, Carito Jones acting as a scribe for Dr. Merced Haro on Apr 25, 2019  at 4:16 PM

## 2019-04-25 NOTE — PHYSICAL EXAM
[No Acute Distress] : no acute distress [Well Nourished] : well nourished [Well Developed] : well developed [Well-Appearing] : well-appearing [PERRL] : pupils equal round and reactive to light [Normal Sclera/Conjunctiva] : normal sclera/conjunctiva [EOMI] : extraocular movements intact [Normal Outer Ear/Nose] : the outer ears and nose were normal in appearance [Supple] : supple [Normal Oropharynx] : the oropharynx was normal [No JVD] : no jugular venous distention [Thyroid Normal, No Nodules] : the thyroid was normal and there were no nodules present [No Respiratory Distress] : no respiratory distress  [No Lymphadenopathy] : no lymphadenopathy [Clear to Auscultation] : lungs were clear to auscultation bilaterally [No Accessory Muscle Use] : no accessory muscle use [Normal Rate] : normal rate  [Regular Rhythm] : with a regular rhythm [Normal S1, S2] : normal S1 and S2 [No Carotid Bruits] : no carotid bruits [No Abdominal Bruit] : a ~M bruit was not heard ~T in the abdomen [No Murmur] : no murmur heard [Pedal Pulses Present] : the pedal pulses are present [No Varicosities] : no varicosities [No Edema] : there was no peripheral edema [No Extremity Clubbing/Cyanosis] : no extremity clubbing/cyanosis [No Palpable Aorta] : no palpable aorta [Soft] : abdomen soft [Non Tender] : non-tender [Non-distended] : non-distended [No Masses] : no abdominal mass palpated [No HSM] : no HSM [Normal Posterior Cervical Nodes] : no posterior cervical lymphadenopathy [Normal Bowel Sounds] : normal bowel sounds [No CVA Tenderness] : no CVA  tenderness [No Spinal Tenderness] : no spinal tenderness [Normal Anterior Cervical Nodes] : no anterior cervical lymphadenopathy [Grossly Normal Strength/Tone] : grossly normal strength/tone [No Joint Swelling] : no joint swelling [No Rash] : no rash [Coordination Grossly Intact] : coordination grossly intact [Normal Gait] : normal gait [No Focal Deficits] : no focal deficits [Deep Tendon Reflexes (DTR)] : deep tendon reflexes were 2+ and symmetric [Normal Affect] : the affect was normal [Normal Insight/Judgement] : insight and judgment were intact

## 2019-04-25 NOTE — RESULTS/DATA
[de-identified] : 19\par NA: 140\par K: 4.5 \par Glucose: 99\par BUN: 16\par Creatinine: 0.8 \par WBC: 3.0\par HB.1\par HCT: 39.5 \par PLTS: 284 \par INR: 0.97\par PT: 10.9\par PTT: 33.5

## 2019-04-25 NOTE — HISTORY OF PRESENT ILLNESS
[FreeTextEntry1] : bronchoscopy [FreeTextEntry2] : 04/30/19 [FreeTextEntry4] : 63 y.o here for medical clearance. Patient is undergoing bronchoscopy on 4/30. Pt states she gets frequent infections and had 4 episodes of pneumonia this year and unexplained fevers. She had a CT scan in March which showed "build up of mucous" per patient. Has been seeing hematologist Dr. Weinberg and undergoes IVIG infusions. Recently met with allergy/immunologist Dr. Rosenthal. She takes Singulair, nortriptyline, Spiriva, Synthroid.  [FreeTextEntry3] : Tenzin Woodruff

## 2019-05-13 ENCOUNTER — APPOINTMENT (OUTPATIENT)
Dept: MAMMOGRAPHY | Facility: CLINIC | Age: 64
End: 2019-05-13
Payer: COMMERCIAL

## 2019-05-13 ENCOUNTER — APPOINTMENT (OUTPATIENT)
Dept: ULTRASOUND IMAGING | Facility: CLINIC | Age: 64
End: 2019-05-13
Payer: COMMERCIAL

## 2019-05-13 ENCOUNTER — OUTPATIENT (OUTPATIENT)
Dept: OUTPATIENT SERVICES | Facility: HOSPITAL | Age: 64
LOS: 1 days | End: 2019-05-13
Payer: COMMERCIAL

## 2019-05-13 DIAGNOSIS — Z00.00 ENCOUNTER FOR GENERAL ADULT MEDICAL EXAMINATION WITHOUT ABNORMAL FINDINGS: ICD-10-CM

## 2019-05-13 DIAGNOSIS — Z12.31 ENCOUNTER FOR SCREENING MAMMOGRAM FOR MALIGNANT NEOPLASM OF BREAST: ICD-10-CM

## 2019-05-13 LAB
BILIRUB UR QL STRIP: NORMAL
CLARITY UR: CLEAR
COLLECTION METHOD: NORMAL
CYTOLOGY CVX/VAG DOC THIN PREP: NORMAL
DATE COLLECTED: NORMAL
GLUCOSE UR-MCNC: NORMAL
HCG UR QL: 0.2 EU/DL
HEMOCCULT SP1 STL QL: NEGATIVE
HGB UR QL STRIP.AUTO: NORMAL
HPV HIGH+LOW RISK DNA PNL CVX: NOT DETECTED
KETONES UR-MCNC: NORMAL
LEUKOCYTE ESTERASE UR QL STRIP: NORMAL
NITRITE UR QL STRIP: NORMAL
PH UR STRIP: 6
PROT UR STRIP-MCNC: NORMAL
QUALITY CONTROL: YES
SP GR UR STRIP: 1

## 2019-05-13 PROCEDURE — 77067 SCR MAMMO BI INCL CAD: CPT

## 2019-05-13 PROCEDURE — 77067 SCR MAMMO BI INCL CAD: CPT | Mod: 26

## 2019-05-13 PROCEDURE — 76641 ULTRASOUND BREAST COMPLETE: CPT | Mod: 26,50

## 2019-05-13 PROCEDURE — 77063 BREAST TOMOSYNTHESIS BI: CPT | Mod: 26

## 2019-05-13 PROCEDURE — 76641 ULTRASOUND BREAST COMPLETE: CPT

## 2019-05-13 PROCEDURE — 77063 BREAST TOMOSYNTHESIS BI: CPT

## 2019-07-17 ENCOUNTER — APPOINTMENT (OUTPATIENT)
Dept: PEDIATRIC PULMONARY CYSTIC FIB | Facility: CLINIC | Age: 64
End: 2019-07-17
Payer: COMMERCIAL

## 2019-07-17 DIAGNOSIS — E87.8 OTHER DISORDERS OF ELECTROLYTE AND FLUID BALANCE, NOT ELSEWHERE CLASSIFIED: ICD-10-CM

## 2019-07-17 PROCEDURE — ZZZZZ: CPT

## 2019-07-19 PROBLEM — E87.8: Status: ACTIVE | Noted: 2019-07-19

## 2019-08-01 ENCOUNTER — RX RENEWAL (OUTPATIENT)
Age: 64
End: 2019-08-01

## 2019-08-06 ENCOUNTER — TRANSCRIPTION ENCOUNTER (OUTPATIENT)
Age: 64
End: 2019-08-06

## 2019-08-15 ENCOUNTER — APPOINTMENT (OUTPATIENT)
Dept: FAMILY MEDICINE | Facility: CLINIC | Age: 64
End: 2019-08-15
Payer: COMMERCIAL

## 2019-08-15 VITALS
OXYGEN SATURATION: 96 % | HEART RATE: 90 BPM | SYSTOLIC BLOOD PRESSURE: 122 MMHG | DIASTOLIC BLOOD PRESSURE: 74 MMHG | HEIGHT: 63 IN | BODY MASS INDEX: 24.1 KG/M2 | WEIGHT: 136 LBS | TEMPERATURE: 98.1 F

## 2019-08-15 DIAGNOSIS — M54.9 DORSALGIA, UNSPECIFIED: ICD-10-CM

## 2019-08-15 LAB
BILIRUB UR QL STRIP: NEGATIVE
GLUCOSE UR-MCNC: NEGATIVE
HCG UR QL: 0.2 EU/DL
HGB UR QL STRIP.AUTO: NEGATIVE
KETONES UR-MCNC: NEGATIVE
LEUKOCYTE ESTERASE UR QL STRIP: NORMAL
NITRITE UR QL STRIP: NEGATIVE
PH UR STRIP: 7
PROT UR STRIP-MCNC: NEGATIVE
SP GR UR STRIP: 1.02

## 2019-08-15 PROCEDURE — 36415 COLL VENOUS BLD VENIPUNCTURE: CPT

## 2019-08-15 PROCEDURE — 99214 OFFICE O/P EST MOD 30 MIN: CPT | Mod: 25

## 2019-08-15 PROCEDURE — 81003 URINALYSIS AUTO W/O SCOPE: CPT | Mod: QW

## 2019-08-15 RX ORDER — NEOMYCIN SULFATE, POLYMYXIN B SULFATE AND DEXAMETHASONE 3.5; 10000; 1 MG/ML; [USP'U]/ML; MG/ML
3.5-10000-0.1 SUSPENSION OPHTHALMIC
Qty: 5 | Refills: 0 | Status: DISCONTINUED | COMMUNITY
Start: 2019-07-19

## 2019-08-16 LAB
ALBUMIN SERPL ELPH-MCNC: 4.2 G/DL
ALP BLD-CCNC: 86 U/L
ALT SERPL-CCNC: 14 U/L
ANION GAP SERPL CALC-SCNC: 11 MMOL/L
APPEARANCE: ABNORMAL
AST SERPL-CCNC: 19 U/L
BACTERIA: NEGATIVE
BASOPHILS # BLD AUTO: 0.03 K/UL
BASOPHILS NFR BLD AUTO: 0.6 %
BILIRUB SERPL-MCNC: 0.3 MG/DL
BILIRUBIN URINE: NEGATIVE
BLOOD URINE: NEGATIVE
BUN SERPL-MCNC: 15 MG/DL
CALCIUM SERPL-MCNC: 9.9 MG/DL
CHLORIDE SERPL-SCNC: 101 MMOL/L
CO2 SERPL-SCNC: 28 MMOL/L
COLOR: YELLOW
CREAT SERPL-MCNC: 0.78 MG/DL
EOSINOPHIL # BLD AUTO: 0.02 K/UL
EOSINOPHIL NFR BLD AUTO: 0.4 %
GLUCOSE QUALITATIVE U: NEGATIVE
GLUCOSE SERPL-MCNC: 93 MG/DL
HCT VFR BLD CALC: 38.6 %
HGB BLD-MCNC: 12.4 G/DL
HYALINE CASTS: 1 /LPF
IMM GRANULOCYTES NFR BLD AUTO: 0.2 %
KETONES URINE: NEGATIVE
LEUKOCYTE ESTERASE URINE: NEGATIVE
LYMPHOCYTES # BLD AUTO: 1.15 K/UL
LYMPHOCYTES NFR BLD AUTO: 23.7 %
MAN DIFF?: NORMAL
MCHC RBC-ENTMCNC: 30 PG
MCHC RBC-ENTMCNC: 32.1 GM/DL
MCV RBC AUTO: 93.5 FL
MICROSCOPIC-UA: NORMAL
MONOCYTES # BLD AUTO: 0.47 K/UL
MONOCYTES NFR BLD AUTO: 9.7 %
NEUTROPHILS # BLD AUTO: 3.18 K/UL
NEUTROPHILS NFR BLD AUTO: 65.4 %
NITRITE URINE: NEGATIVE
PH URINE: 6.5
PLATELET # BLD AUTO: 254 K/UL
POTASSIUM SERPL-SCNC: 4.6 MMOL/L
PROT SERPL-MCNC: 8.6 G/DL
PROTEIN URINE: NORMAL
RBC # BLD: 4.13 M/UL
RBC # FLD: 13.4 %
RED BLOOD CELLS URINE: 2 /HPF
SODIUM SERPL-SCNC: 140 MMOL/L
SPECIFIC GRAVITY URINE: 1.02
SQUAMOUS EPITHELIAL CELLS: 5 /HPF
UROBILINOGEN URINE: NORMAL
WBC # FLD AUTO: 4.86 K/UL
WHITE BLOOD CELLS URINE: 2 /HPF

## 2019-08-17 LAB — BACTERIA UR CULT: NORMAL

## 2019-08-21 PROBLEM — Z80.3 FAMILY HISTORY OF MALIGNANT NEOPLASM OF BREAST: Status: ACTIVE | Noted: 2019-04-24

## 2019-08-21 PROBLEM — Z86.59 HISTORY OF DEPRESSION: Status: RESOLVED | Noted: 2019-04-24 | Resolved: 2019-08-21

## 2019-08-21 NOTE — HISTORY OF PRESENT ILLNESS
[1 Year Ago] : 1 year ago [Good] : being in good health [Healthy Diet] : a healthy diet [Regular Exercise] : regular exercise [Last Mammogram ___] : Last Mammogram was [unfilled] [Last Bone Density ___] : Last bone density studies [unfilled] [Last Pap ___] : Last cervical pap smear was [unfilled] [Postmenopausal] : is postmenopausal [unknown] : the patient is unsure of the date of her LMP [Menarche Age: ____] : age at menarche was [unfilled] [Post-Menopause, No Sxs] : post-menopausal, currently without symptoms [Sexually Active] : is sexually active [Monogamous] : is monogamous [Male ___] : [unfilled] male [Weight Concerns] : no concerns with her weight [de-identified] : Pelvic US 3/5/2018 [Hot Flashes] : no hot flashes [Night Sweats] : no night sweats [Vaginal Itching] : no vaginal itching [Dyspareunia] : no dyspareunia [Mood Changes] : no mood changes [Contraception] : does not use contraception

## 2019-08-27 ENCOUNTER — APPOINTMENT (OUTPATIENT)
Dept: PULMONOLOGY | Facility: CLINIC | Age: 64
End: 2019-08-27
Payer: COMMERCIAL

## 2019-08-27 VITALS
WEIGHT: 134 LBS | BODY MASS INDEX: 23.74 KG/M2 | TEMPERATURE: 98.7 F | SYSTOLIC BLOOD PRESSURE: 145 MMHG | RESPIRATION RATE: 18 BRPM | DIASTOLIC BLOOD PRESSURE: 89 MMHG | HEIGHT: 63 IN | HEART RATE: 104 BPM | OXYGEN SATURATION: 96 %

## 2019-08-27 DIAGNOSIS — R89.0 ABNORMAL LVL OF ENZYMES IN SPECIMENS FROM OTHER ORGANS, SYSTEMS AND TISSUES: ICD-10-CM

## 2019-08-27 PROCEDURE — ZZZZZ: CPT

## 2019-08-27 PROCEDURE — 94729 DIFFUSING CAPACITY: CPT

## 2019-08-27 PROCEDURE — 94060 EVALUATION OF WHEEZING: CPT

## 2019-08-27 PROCEDURE — 99205 OFFICE O/P NEW HI 60 MIN: CPT | Mod: 25

## 2019-08-27 PROCEDURE — 94726 PLETHYSMOGRAPHY LUNG VOLUMES: CPT

## 2019-08-27 NOTE — PHYSICAL EXAM
[General Appearance - Well Developed] : well developed [Normal Appearance] : normal appearance [Well Groomed] : well groomed [General Appearance - Well Nourished] : well nourished [General Appearance - In No Acute Distress] : no acute distress [Eyelids - No Xanthelasma] : the eyelids demonstrated no xanthelasmas [Normal Conjunctiva] : the conjunctiva exhibited no abnormalities [Normal Oropharynx] : normal oropharynx [Neck Appearance] : the appearance of the neck was normal [Neck Cervical Mass (___cm)] : no neck mass was observed [Jugular Venous Distention Increased] : there was no jugular-venous distention [Thyroid Diffuse Enlargement] : the thyroid was not enlarged [Thyroid Nodule] : there were no palpable thyroid nodules [Heart Rate And Rhythm] : heart rate and rhythm were normal [Heart Sounds] : normal S1 and S2 [Murmurs] : no murmurs present [Respiration, Rhythm And Depth] : normal respiratory rhythm and effort [Exaggerated Use Of Accessory Muscles For Inspiration] : no accessory muscle use [Auscultation Breath Sounds / Voice Sounds] : lungs were clear to auscultation bilaterally [Abdomen Soft] : soft [Abdomen Tenderness] : non-tender [Abdomen Mass (___ Cm)] : no abdominal mass palpated [Abnormal Walk] : normal gait [Gait - Sufficient For Exercise Testing] : the gait was sufficient for exercise testing [Petechial Hemorrhages (___cm)] : no petechial hemorrhages [Cyanosis, Localized] : no localized cyanosis [Nail Clubbing] : no clubbing of the fingernails [Skin Color & Pigmentation] : normal skin color and pigmentation [Skin Turgor] : normal skin turgor [] : no rash [Sensation] : the sensory exam was normal to light touch and pinprick [No Focal Deficits] : no focal deficits [Deep Tendon Reflexes (DTR)] : deep tendon reflexes were 2+ and symmetric [Affect] : the affect was normal [Impaired Insight] : insight and judgment were intact [Oriented To Time, Place, And Person] : oriented to person, place, and time

## 2019-08-28 PROBLEM — R89.0 ABNORMAL SWEAT CHORIDE TEST WITHOUT DIAGNOSIS OF CYSTIC FIBROSIS: Status: ACTIVE | Noted: 2019-08-28

## 2019-08-28 NOTE — END OF VISIT
[] : Fellow [>50% of Time Spent on Counseling and Coordination of Care for  ___] : Greater than 50% of the encounter time was spent on counseling and coordination of care for [unfilled] [FreeTextEntry3] : Time spent: From 3:30 PM to 4:30 PM. Discussed at length of medications of diagnosis of cystic fibrosis, workup for CF, patient has reason and for frequent pneumonia as given her diagnosis of CVID, however in the setting of a borderline sweat test, she was referred by allergy/immunology to evaluate for cystic fibrosis. She denies significant sinus disease, no demonstrated bronchiectasis but recurrent new pulmonary infiltrates which she gets treated with oral antibiotics such as Levaquin and azithromycin. Denies any history of needing IV antibiotics. Most recent CT chest was 8/19/19 which demonstrated a new 2 cm opacity and posterior lateral left lower lobe, scattered tiny nodules in left lower lobe.\par History of bronchoscopy 7 years ago with transbronchial biopsy but was told cultures were "normal". And does not know any history of positive sputum cultures.\par She reports having dry, occasional cough, denies dyspnea. Was worked up for dysphasia and aspiration. Esophageal manometry results are pending. Had a swallow study but denies results of aspiration. Does not know results of esophagram.\par Currently on Spiriva (was on Symbicort in the past) and is followed by outside pulmonologist.\par \par Ordered complete genetic sequencing for cystic fibrosis workup. Patient is not able to expectorate any sputum.\par Had pt sign medical release form to get records of sputum cx, bronch report, pft. [Time Spent: ___ minutes] : I have spent [unfilled] minutes of face to face time with the patient

## 2019-08-28 NOTE — ASSESSMENT
[FreeTextEntry1] : 63 yo F with history of CVID, hypothyroidism, and fibromyalgia referred by her allergist/ immunologist for further evaluation of borderline sweat chloride test.\par \par 1) Borderline positive sweat chloride test - Clinically there is low suspicion of CF. Aside from her frequent pneumonias and mild obstructive disease, she has no extra-pulmonary symptoms and she has no suggestive childhood or family history. However, will send for a complete genetic panel to further assess sweat test. Patient was counseled and explained about possible treatments after the test, in the case that she was found to have homozygous genetic mutations.\par  \par 2) Recurrent Pneumonias - Her symptoms can be explained by her CVID. However her symptoms have become worse and more frequent despite continued IgG infusion. Cultures and bronchoscopic exam have been inconclusive per her. We have asker her send over medical records from her pulmonologist (Dr. Ramsey) office for the purpose of understanding workup thus far. Per her, she also has been evaluated for GERD since chronic aspiration can be a nidus for her frequent pneumonias. She has had an esophagogram and manometry, but has not been notified of any abnormal results thus far.

## 2019-08-28 NOTE — HISTORY OF PRESENT ILLNESS
[FreeTextEntry1] : 65 yo F with history of CVID, hypothyroidism, and fibromyalgia referred by her allergist/ immunologist for further evaluation after a borderline sweat chloride test. She was evaluated by the immunologist for frequent pneumonias that have been happening to her over the last few years. Part of the immunologic workup included a sweat chloride test, which came back borderline (45). She has chronic sinusitis, which has been attributed to her CVID. Other than that though, she has no history of pancreatitis, hepatobiliary issues, constipation, foul-smelling and/ or oily stool, or any other symptoms suggestive of extra-pulmonary manifestations of CF.\par She currently feels well with no respiratory issues. She has occasional dry coughs and dyspnea during her pneumonia episodes, but otherwise has no wheezing, no chest pain, and ROS is negative.

## 2019-09-09 ENCOUNTER — MOBILE ON CALL (OUTPATIENT)
Age: 64
End: 2019-09-09

## 2019-10-14 ENCOUNTER — RX RENEWAL (OUTPATIENT)
Age: 64
End: 2019-10-14

## 2019-12-23 ENCOUNTER — APPOINTMENT (OUTPATIENT)
Dept: FAMILY MEDICINE | Facility: CLINIC | Age: 64
End: 2019-12-23
Payer: COMMERCIAL

## 2019-12-23 VITALS
WEIGHT: 127 LBS | OXYGEN SATURATION: 98 % | HEART RATE: 90 BPM | DIASTOLIC BLOOD PRESSURE: 26 MMHG | SYSTOLIC BLOOD PRESSURE: 128 MMHG | BODY MASS INDEX: 22.5 KG/M2 | HEIGHT: 63 IN

## 2019-12-23 VITALS — DIASTOLIC BLOOD PRESSURE: 80 MMHG | SYSTOLIC BLOOD PRESSURE: 128 MMHG

## 2019-12-23 DIAGNOSIS — N13.30 UNSPECIFIED HYDRONEPHROSIS: ICD-10-CM

## 2019-12-23 PROCEDURE — G0442 ANNUAL ALCOHOL SCREEN 15 MIN: CPT

## 2019-12-23 PROCEDURE — G0444 DEPRESSION SCREEN ANNUAL: CPT

## 2019-12-23 PROCEDURE — 99396 PREV VISIT EST AGE 40-64: CPT | Mod: 25

## 2019-12-23 RX ORDER — CLARITHROMYCIN 500 MG/1
500 TABLET, FILM COATED ORAL TWICE DAILY
Qty: 20 | Refills: 0 | Status: DISCONTINUED | COMMUNITY
Start: 2019-08-15 | End: 2019-12-23

## 2019-12-23 NOTE — ADDENDUM
[FreeTextEntry1] : I, Tamia Siddiqi acting as a scribe for Dr. Merced Haro on Dec 23, 2019  at 2:17 PM\par

## 2019-12-23 NOTE — HEALTH RISK ASSESSMENT
[Patient reported mammogram was normal] : Patient reported mammogram was normal [Patient reported PAP Smear was normal] : Patient reported PAP Smear was normal [Patient reported bone density results were normal] : Patient reported bone density results were normal [Patient reported colonoscopy was normal] : Patient reported colonoscopy was normal [Good] : ~his/her~  mood as  good [With Family] : lives with family [None] : None [Employed] : employed [] :  [Fully functional (using the telephone, shopping, preparing meals, housekeeping, doing laundry, using] : Fully functional and needs no help or supervision to perform IADLs (using the telephone, shopping, preparing meals, housekeeping, doing laundry, using transportation, managing medications and managing finances) [Feels Safe at Home] : Feels safe at home [Fully functional (bathing, dressing, toileting, transferring, walking, feeding)] : Fully functional (bathing, dressing, toileting, transferring, walking, feeding) [MammogramDate] : 05/19 [PapSmearDate] : 04/19 [BoneDensityDate] : 04/16 [ColonoscopyDate] : 03/19

## 2019-12-23 NOTE — PHYSICAL EXAM
[Well Nourished] : well nourished [No Acute Distress] : no acute distress [Well Developed] : well developed [Well-Appearing] : well-appearing [Normal Sclera/Conjunctiva] : normal sclera/conjunctiva [PERRL] : pupils equal round and reactive to light [EOMI] : extraocular movements intact [Normal Outer Ear/Nose] : the outer ears and nose were normal in appearance [Normal Oropharynx] : the oropharynx was normal [No JVD] : no jugular venous distention [No Lymphadenopathy] : no lymphadenopathy [Supple] : supple [Thyroid Normal, No Nodules] : the thyroid was normal and there were no nodules present [No Respiratory Distress] : no respiratory distress  [Clear to Auscultation] : lungs were clear to auscultation bilaterally [No Accessory Muscle Use] : no accessory muscle use [Normal Rate] : normal rate  [Regular Rhythm] : with a regular rhythm [No Murmur] : no murmur heard [Normal S1, S2] : normal S1 and S2 [No Abdominal Bruit] : a ~M bruit was not heard ~T in the abdomen [No Carotid Bruits] : no carotid bruits [No Edema] : there was no peripheral edema [Pedal Pulses Present] : the pedal pulses are present [No Varicosities] : no varicosities [No Palpable Aorta] : no palpable aorta [No Extremity Clubbing/Cyanosis] : no extremity clubbing/cyanosis [Soft] : abdomen soft [Non Tender] : non-tender [No Masses] : no abdominal mass palpated [Non-distended] : non-distended [Normal Posterior Cervical Nodes] : no posterior cervical lymphadenopathy [Normal Bowel Sounds] : normal bowel sounds [No HSM] : no HSM [No CVA Tenderness] : no CVA  tenderness [Normal Anterior Cervical Nodes] : no anterior cervical lymphadenopathy [No Joint Swelling] : no joint swelling [No Spinal Tenderness] : no spinal tenderness [Grossly Normal Strength/Tone] : grossly normal strength/tone [No Rash] : no rash [Coordination Grossly Intact] : coordination grossly intact [Normal Affect] : the affect was normal [Deep Tendon Reflexes (DTR)] : deep tendon reflexes were 2+ and symmetric [No Focal Deficits] : no focal deficits [Normal Gait] : normal gait [Normal Insight/Judgement] : insight and judgment were intact

## 2019-12-23 NOTE — END OF VISIT
[FreeTextEntry3] : Medical record entries made by the scribe today today, were at my direction and personally dictated to them by me, Dr. Merced Haro on Dec 23, 2019. I have reviewed the chart and agree that the record accurately reflects my personal performance of the history, physical exam, assessment, and plan.\par \par

## 2019-12-23 NOTE — HISTORY OF PRESENT ILLNESS
[FreeTextEntry1] : CPE [de-identified] : STEPHY is a 64 year female here for CPE. Mood is good. Followed up with  who dx with flu recently. Had - rapid flu but presented with all sxs. Was given Tamiflu but it made her sick so she did not take the entire course. Pt has returned to baseline. Pt was referred to AI / for extensive testing. She was than referred to pulmonologist  who did extensive testing which revealed she does not have cystic fibrosis.  sent her to GI at [place] where she had esophageal imaging. Imaging showed issues with movement. Had manometry which showed spasms. She is going to have the second half of the test to r/o reflux. She has also followed up with rheum  to r/o auto immune disorders. Pt followed up with dermatologist within the past year and a half. Pt reports that she had LT flank pain 08/19 and she was sent for renal US. US showed RT hydronephrosis. She admits to not following up with urologist. Pt leads an active lifestyle and eats a well balance diet. Has been avoiding wheat/sugar.

## 2019-12-26 LAB
25(OH)D3 SERPL-MCNC: 61.2 NG/ML
ALBUMIN SERPL ELPH-MCNC: 4.5 G/DL
ALP BLD-CCNC: 63 U/L
ALT SERPL-CCNC: 17 U/L
ANION GAP SERPL CALC-SCNC: 14 MMOL/L
AST SERPL-CCNC: 22 U/L
BASOPHILS # BLD AUTO: 0.06 K/UL
BASOPHILS NFR BLD AUTO: 1.5 %
BILIRUB SERPL-MCNC: 0.2 MG/DL
BUN SERPL-MCNC: 15 MG/DL
CALCIUM SERPL-MCNC: 10.4 MG/DL
CHLORIDE SERPL-SCNC: 102 MMOL/L
CHOLEST SERPL-MCNC: 223 MG/DL
CHOLEST/HDLC SERPL: 2.3 RATIO
CO2 SERPL-SCNC: 28 MMOL/L
CREAT SERPL-MCNC: 0.81 MG/DL
EOSINOPHIL # BLD AUTO: 0.04 K/UL
EOSINOPHIL NFR BLD AUTO: 1 %
ESTIMATED AVERAGE GLUCOSE: 114 MG/DL
GLUCOSE SERPL-MCNC: 91 MG/DL
HBA1C MFR BLD HPLC: 5.6 %
HCT VFR BLD CALC: 38.7 %
HDLC SERPL-MCNC: 97 MG/DL
HGB BLD-MCNC: 12.5 G/DL
IMM GRANULOCYTES NFR BLD AUTO: 0.3 %
LDLC SERPL CALC-MCNC: 117 MG/DL
LYMPHOCYTES # BLD AUTO: 1.46 K/UL
LYMPHOCYTES NFR BLD AUTO: 36.9 %
MAN DIFF?: NORMAL
MCHC RBC-ENTMCNC: 29.4 PG
MCHC RBC-ENTMCNC: 32.3 GM/DL
MCV RBC AUTO: 91.1 FL
MONOCYTES # BLD AUTO: 0.29 K/UL
MONOCYTES NFR BLD AUTO: 7.3 %
NEUTROPHILS # BLD AUTO: 2.1 K/UL
NEUTROPHILS NFR BLD AUTO: 53 %
PLATELET # BLD AUTO: 280 K/UL
POTASSIUM SERPL-SCNC: 4.5 MMOL/L
PROT SERPL-MCNC: 7 G/DL
RBC # BLD: 4.25 M/UL
RBC # FLD: 13.2 %
SODIUM SERPL-SCNC: 143 MMOL/L
TRIGL SERPL-MCNC: 46 MG/DL
TSH SERPL-ACNC: 1.94 UIU/ML
URATE SERPL-MCNC: 4 MG/DL
WBC # FLD AUTO: 3.96 K/UL

## 2020-01-08 ENCOUNTER — RX RENEWAL (OUTPATIENT)
Age: 65
End: 2020-01-08

## 2020-01-23 ENCOUNTER — APPOINTMENT (OUTPATIENT)
Dept: FAMILY MEDICINE | Facility: CLINIC | Age: 65
End: 2020-01-23
Payer: COMMERCIAL

## 2020-01-23 VITALS
BODY MASS INDEX: 22.86 KG/M2 | DIASTOLIC BLOOD PRESSURE: 74 MMHG | SYSTOLIC BLOOD PRESSURE: 124 MMHG | HEART RATE: 90 BPM | TEMPERATURE: 98.4 F | WEIGHT: 129 LBS | HEIGHT: 63 IN | OXYGEN SATURATION: 98 %

## 2020-01-23 PROCEDURE — 99214 OFFICE O/P EST MOD 30 MIN: CPT

## 2020-01-23 RX ORDER — OSELTAMIVIR PHOSPHATE 75 MG/1
75 CAPSULE ORAL
Qty: 14 | Refills: 0 | Status: COMPLETED | COMMUNITY
Start: 2019-12-04

## 2020-01-23 NOTE — PHYSICAL EXAM
[No Acute Distress] : no acute distress [Well Developed] : well developed [Well Nourished] : well nourished [Well-Appearing] : well-appearing [PERRL] : pupils equal round and reactive to light [EOMI] : extraocular movements intact [Normal Outer Ear/Nose] : the outer ears and nose were normal in appearance [No JVD] : no jugular venous distention [No Accessory Muscle Use] : no accessory muscle use [No Respiratory Distress] : no respiratory distress  [Clear to Auscultation] : lungs were clear to auscultation bilaterally [Normal Rate] : normal rate  [Regular Rhythm] : with a regular rhythm [Normal S1, S2] : normal S1 and S2 [No Edema] : there was no peripheral edema [Non-distended] : non-distended [No CVA Tenderness] : no CVA  tenderness [Grossly Normal Strength/Tone] : grossly normal strength/tone [Coordination Grossly Intact] : coordination grossly intact [No Rash] : no rash [Normal Affect] : the affect was normal [Normal Gait] : normal gait [No Focal Deficits] : no focal deficits [Normal Mood] : the mood was normal [Normal Insight/Judgement] : insight and judgment were intact [de-identified] : +B/L frontal and maxillary sinus TTP  [de-identified] : +PND, mild erythema post pharynx, dull TMS B R>L

## 2020-01-23 NOTE — ASSESSMENT
[FreeTextEntry1] : aggressive hydration!!!!!!!\par \par see med orders \par \par daily probiotic during abx course

## 2020-01-23 NOTE — HISTORY OF PRESENT ILLNESS
[Congestion] : congestion [Cough] : cough [Cold Symptoms] : cold symptoms [Sore Throat] : sore throat [Earache (R)] : pain in right ear [___ Days ago] : [unfilled] days ago [FreeTextEntry8] : 63 yo female c several week hx of sinus/nasal congestion.    +yellow nasal d/c,  no f/c/s +facial pressure  +dry cough  +ve sore throat +B/L ear pressure  +ve air travel    +ve decreased appetite \par \par s/p completion of Levaquin 500mg qd x 7 days 1/18/20. \par \par no relief c OTC remedies  \par \par as above despite FLonase NS 2 sp qd

## 2020-02-12 ENCOUNTER — APPOINTMENT (OUTPATIENT)
Dept: FAMILY MEDICINE | Facility: CLINIC | Age: 65
End: 2020-02-12
Payer: COMMERCIAL

## 2020-02-12 VITALS
WEIGHT: 129 LBS | OXYGEN SATURATION: 96 % | BODY MASS INDEX: 22.86 KG/M2 | SYSTOLIC BLOOD PRESSURE: 146 MMHG | DIASTOLIC BLOOD PRESSURE: 82 MMHG | HEART RATE: 91 BPM | HEIGHT: 63 IN

## 2020-02-12 DIAGNOSIS — Z86.69 PERSONAL HISTORY OF OTHER DISEASES OF THE NERVOUS SYSTEM AND SENSE ORGANS: ICD-10-CM

## 2020-02-12 PROCEDURE — 99214 OFFICE O/P EST MOD 30 MIN: CPT

## 2020-02-12 RX ORDER — METHYLPREDNISOLONE 4 MG/1
4 TABLET ORAL
Qty: 1 | Refills: 0 | Status: COMPLETED | COMMUNITY
Start: 2020-01-23 | End: 2020-02-12

## 2020-02-12 RX ORDER — DOXYCYCLINE HYCLATE 100 MG/1
100 TABLET ORAL TWICE DAILY
Qty: 20 | Refills: 0 | Status: COMPLETED | COMMUNITY
Start: 2020-01-23 | End: 2020-02-12

## 2020-02-12 NOTE — REVIEW OF SYSTEMS
[Negative] : Heme/Lymph [FreeTextEntry3] : see HPI  [FreeTextEntry4] : see HPI  [FreeTextEntry6] : see HPI

## 2020-02-12 NOTE — HISTORY OF PRESENT ILLNESS
[FreeTextEntry8] : Pt c/o left eye crusted shut this morning, left eye redness, pressure in head, stuffy nose, and scratchy throat x3 days.\par \par 63 yo female presents to office c 3-5 day hx of scratchy throat and sneezing.    Pt states L eye started crusting yesterday.  \par +ve itchy eye.   \par no f/c/s no N/V/D no abd pain  denies nasal d/c  +B/L ear pressure L>R     +ve dry cough \par \par reports some relief c OTC Saline

## 2020-02-12 NOTE — ASSESSMENT
[FreeTextEntry1] : cont Flonase, cont Singulair \par \par see med orders \par \par Eye hygiene education

## 2020-02-12 NOTE — PHYSICAL EXAM
[No Acute Distress] : no acute distress [Well Nourished] : well nourished [Well Developed] : well developed [Well-Appearing] : well-appearing [PERRL] : pupils equal round and reactive to light [No Respiratory Distress] : no respiratory distress  [No Accessory Muscle Use] : no accessory muscle use [Clear to Auscultation] : lungs were clear to auscultation bilaterally [Normal Rate] : normal rate  [Regular Rhythm] : with a regular rhythm [Normal S1, S2] : normal S1 and S2 [No Edema] : there was no peripheral edema [Non-distended] : non-distended [No CVA Tenderness] : no CVA  tenderness [Grossly Normal Strength/Tone] : grossly normal strength/tone [No Rash] : no rash [No Focal Deficits] : no focal deficits [Coordination Grossly Intact] : coordination grossly intact [Normal Gait] : normal gait [Normal Affect] : the affect was normal [Normal Mood] : the mood was normal [Normal Insight/Judgement] : insight and judgment were intact [de-identified] : +ve injected conjunctiva OS  [de-identified] : +PND post pharynx, dull TMs B/L  [de-identified] : +nodes

## 2020-02-14 ENCOUNTER — APPOINTMENT (OUTPATIENT)
Dept: FAMILY MEDICINE | Facility: CLINIC | Age: 65
End: 2020-02-14
Payer: COMMERCIAL

## 2020-02-14 VITALS
SYSTOLIC BLOOD PRESSURE: 140 MMHG | WEIGHT: 129 LBS | DIASTOLIC BLOOD PRESSURE: 90 MMHG | HEIGHT: 63 IN | OXYGEN SATURATION: 97 % | TEMPERATURE: 98.1 F | BODY MASS INDEX: 22.86 KG/M2 | HEART RATE: 101 BPM

## 2020-02-14 PROCEDURE — 99214 OFFICE O/P EST MOD 30 MIN: CPT

## 2020-02-14 NOTE — PHYSICAL EXAM
[No Acute Distress] : no acute distress [Well Nourished] : well nourished [Well Developed] : well developed [Well-Appearing] : well-appearing [EOMI] : extraocular movements intact [No JVD] : no jugular venous distention [No Respiratory Distress] : no respiratory distress  [No Accessory Muscle Use] : no accessory muscle use [Clear to Auscultation] : lungs were clear to auscultation bilaterally [Normal Rate] : normal rate  [Regular Rhythm] : with a regular rhythm [Normal S1, S2] : normal S1 and S2 [No Edema] : there was no peripheral edema [Non-distended] : non-distended [No CVA Tenderness] : no CVA  tenderness [Grossly Normal Strength/Tone] : grossly normal strength/tone [No Rash] : no rash [Coordination Grossly Intact] : coordination grossly intact [No Focal Deficits] : no focal deficits [Normal Gait] : normal gait [Normal Affect] : the affect was normal [Normal Mood] : the mood was normal [Normal Insight/Judgement] : insight and judgment were intact [de-identified] : +PND post pharynx, dull TMs B

## 2020-02-14 NOTE — HISTORY OF PRESENT ILLNESS
[FreeTextEntry8] : Pt here due to back pain and right side chest pain , coughing,  Pt had Pneumonia before and thinks she has it again \par \par 63 yo female presents to office c sudden onset R sided back/chest discomfort.  Pt states was awoken from sleep at 5 am.   Pt states pain has improved throughout day, however, pain present c deep inspiration.  \par Denies fever/chills/sweats \par +ve dry cough\par +ve good appetite \par decreased energy level

## 2020-03-18 ENCOUNTER — APPOINTMENT (OUTPATIENT)
Dept: FAMILY MEDICINE | Facility: CLINIC | Age: 65
End: 2020-03-18

## 2020-04-03 ENCOUNTER — RX RENEWAL (OUTPATIENT)
Age: 65
End: 2020-04-03

## 2020-06-09 ENCOUNTER — RX RENEWAL (OUTPATIENT)
Age: 65
End: 2020-06-09

## 2020-06-16 ENCOUNTER — APPOINTMENT (OUTPATIENT)
Dept: OBGYN | Facility: CLINIC | Age: 65
End: 2020-06-16
Payer: COMMERCIAL

## 2020-06-16 VITALS
SYSTOLIC BLOOD PRESSURE: 112 MMHG | BODY MASS INDEX: 23.74 KG/M2 | DIASTOLIC BLOOD PRESSURE: 68 MMHG | WEIGHT: 134 LBS | HEIGHT: 63 IN

## 2020-06-16 DIAGNOSIS — N94.10 UNSPECIFIED DYSPAREUNIA: ICD-10-CM

## 2020-06-16 PROCEDURE — 82270 OCCULT BLOOD FECES: CPT

## 2020-06-16 PROCEDURE — 99396 PREV VISIT EST AGE 40-64: CPT

## 2020-06-16 RX ORDER — DOXYCYCLINE 100 MG/1
100 TABLET, FILM COATED ORAL
Qty: 20 | Refills: 0 | Status: DISCONTINUED | COMMUNITY
Start: 2020-02-14 | End: 2020-06-16

## 2020-06-16 RX ORDER — TOBRAMYCIN 3 MG/ML
0.3 SOLUTION/ DROPS OPHTHALMIC
Qty: 1 | Refills: 0 | Status: DISCONTINUED | COMMUNITY
Start: 2020-02-12 | End: 2020-06-16

## 2020-06-16 RX ORDER — AZITHROMYCIN 500 MG/1
500 TABLET, FILM COATED ORAL DAILY
Qty: 7 | Refills: 0 | Status: DISCONTINUED | COMMUNITY
Start: 2020-02-14 | End: 2020-06-16

## 2020-06-16 RX ORDER — BUDESONIDE 0.5 MG/2ML
0.5 INHALANT ORAL
Qty: 120 | Refills: 0 | Status: DISCONTINUED | COMMUNITY
Start: 2019-06-19 | End: 2020-06-16

## 2020-06-16 NOTE — HISTORY OF PRESENT ILLNESS
[Last Mammogram ___] : Last Mammogram was [unfilled] [Sexually Active] : is sexually active [Male ___] : [unfilled] male [Last Bone Density ___] : Last bone density studies [unfilled] [Last Colonoscopy ___] : Last colonoscopy [unfilled]

## 2020-06-18 NOTE — PHYSICAL EXAM
[Awake] : awake [Alert] : alert [Acute Distress] : no acute distress [Mass] : no breast mass [Nipple Discharge] : no nipple discharge [Axillary LAD] : no axillary lymphadenopathy [Tender] : non tender [Soft] : soft [Oriented x3] : oriented to person, place, and time [Labia Majora] : labia major [Labia Minora] : labia minora [Atrophy] : atrophy [Normal] : clitoris [No Bleeding] : there was no active vaginal bleeding [Dry Mucosa] : dry mucosa [Tenderness] : tenderness [CTAB] : CTAB [Uterine Adnexae] : were not tender and not enlarged [RRR, No Murmurs] : RRR, no murmurs [FreeTextEntry4] : tenderness over the perineal body at the site of prior scarring.

## 2020-06-23 LAB — CYTOLOGY CVX/VAG DOC THIN PREP: ABNORMAL

## 2020-06-25 ENCOUNTER — RX RENEWAL (OUTPATIENT)
Age: 65
End: 2020-06-25

## 2020-06-27 LAB
DATE COLLECTED: NORMAL
HEMOCCULT SP1 STL QL: NEGATIVE
QUALITY CONTROL: YES

## 2020-07-08 ENCOUNTER — RESULT REVIEW (OUTPATIENT)
Age: 65
End: 2020-07-08

## 2020-07-08 ENCOUNTER — APPOINTMENT (OUTPATIENT)
Dept: MAMMOGRAPHY | Facility: CLINIC | Age: 65
End: 2020-07-08
Payer: COMMERCIAL

## 2020-07-08 ENCOUNTER — OUTPATIENT (OUTPATIENT)
Dept: OUTPATIENT SERVICES | Facility: HOSPITAL | Age: 65
LOS: 1 days | End: 2020-07-08
Payer: COMMERCIAL

## 2020-07-08 ENCOUNTER — APPOINTMENT (OUTPATIENT)
Dept: RADIOLOGY | Facility: CLINIC | Age: 65
End: 2020-07-08
Payer: COMMERCIAL

## 2020-07-08 ENCOUNTER — APPOINTMENT (OUTPATIENT)
Dept: ULTRASOUND IMAGING | Facility: CLINIC | Age: 65
End: 2020-07-08
Payer: COMMERCIAL

## 2020-07-08 DIAGNOSIS — Z78.0 ASYMPTOMATIC MENOPAUSAL STATE: ICD-10-CM

## 2020-07-08 DIAGNOSIS — N60.19 DIFFUSE CYSTIC MASTOPATHY OF UNSPECIFIED BREAST: ICD-10-CM

## 2020-07-08 PROCEDURE — 77067 SCR MAMMO BI INCL CAD: CPT | Mod: 26

## 2020-07-08 PROCEDURE — 77080 DXA BONE DENSITY AXIAL: CPT

## 2020-07-08 PROCEDURE — 77063 BREAST TOMOSYNTHESIS BI: CPT | Mod: 26

## 2020-07-08 PROCEDURE — 76641 ULTRASOUND BREAST COMPLETE: CPT | Mod: 26,50

## 2020-07-08 PROCEDURE — 77063 BREAST TOMOSYNTHESIS BI: CPT

## 2020-07-08 PROCEDURE — 77080 DXA BONE DENSITY AXIAL: CPT | Mod: 26

## 2020-07-08 PROCEDURE — 76641 ULTRASOUND BREAST COMPLETE: CPT

## 2020-07-08 PROCEDURE — 77067 SCR MAMMO BI INCL CAD: CPT

## 2020-07-21 ENCOUNTER — RX RENEWAL (OUTPATIENT)
Age: 65
End: 2020-07-21

## 2020-07-21 RX ORDER — LEVOCETIRIZINE DIHYDROCHLORIDE 5 MG/1
5 TABLET ORAL DAILY
Qty: 30 | Refills: 3 | Status: ACTIVE | COMMUNITY
Start: 2020-02-12 | End: 1900-01-01

## 2020-10-25 ENCOUNTER — RX RENEWAL (OUTPATIENT)
Age: 65
End: 2020-10-25

## 2020-10-29 ENCOUNTER — RX RENEWAL (OUTPATIENT)
Age: 65
End: 2020-10-29

## 2020-12-16 PROBLEM — Z87.09 HISTORY OF PHARYNGITIS: Status: RESOLVED | Noted: 2018-11-20 | Resolved: 2020-12-16

## 2020-12-21 ENCOUNTER — RX RENEWAL (OUTPATIENT)
Age: 65
End: 2020-12-21

## 2020-12-22 ENCOUNTER — APPOINTMENT (OUTPATIENT)
Dept: FAMILY MEDICINE | Facility: CLINIC | Age: 65
End: 2020-12-22
Payer: COMMERCIAL

## 2020-12-22 ENCOUNTER — RX RENEWAL (OUTPATIENT)
Age: 65
End: 2020-12-22

## 2020-12-22 PROCEDURE — 99072 ADDL SUPL MATRL&STAF TM PHE: CPT

## 2020-12-22 PROCEDURE — 90715 TDAP VACCINE 7 YRS/> IM: CPT

## 2020-12-22 PROCEDURE — 90471 IMMUNIZATION ADMIN: CPT

## 2020-12-23 PROBLEM — Z86.69 HISTORY OF CONJUNCTIVITIS: Status: RESOLVED | Noted: 2020-02-12 | Resolved: 2020-12-23

## 2021-01-12 ENCOUNTER — RX RENEWAL (OUTPATIENT)
Age: 66
End: 2021-01-12

## 2021-03-02 ENCOUNTER — RX RENEWAL (OUTPATIENT)
Age: 66
End: 2021-03-02

## 2021-03-04 ENCOUNTER — APPOINTMENT (OUTPATIENT)
Dept: FAMILY MEDICINE | Facility: CLINIC | Age: 66
End: 2021-03-04
Payer: COMMERCIAL

## 2021-03-04 ENCOUNTER — NON-APPOINTMENT (OUTPATIENT)
Age: 66
End: 2021-03-04

## 2021-03-04 VITALS — DIASTOLIC BLOOD PRESSURE: 82 MMHG | SYSTOLIC BLOOD PRESSURE: 128 MMHG

## 2021-03-04 VITALS
WEIGHT: 134 LBS | OXYGEN SATURATION: 98 % | BODY MASS INDEX: 23.74 KG/M2 | SYSTOLIC BLOOD PRESSURE: 132 MMHG | HEART RATE: 82 BPM | TEMPERATURE: 98.1 F | HEIGHT: 63 IN | DIASTOLIC BLOOD PRESSURE: 80 MMHG

## 2021-03-04 DIAGNOSIS — J32.9 CHRONIC SINUSITIS, UNSPECIFIED: ICD-10-CM

## 2021-03-04 DIAGNOSIS — K22.4 DYSKINESIA OF ESOPHAGUS: ICD-10-CM

## 2021-03-04 DIAGNOSIS — B37.0 CANDIDAL STOMATITIS: ICD-10-CM

## 2021-03-04 PROCEDURE — G0444 DEPRESSION SCREEN ANNUAL: CPT | Mod: NC,59

## 2021-03-04 PROCEDURE — 99072 ADDL SUPL MATRL&STAF TM PHE: CPT

## 2021-03-04 PROCEDURE — G0442 ANNUAL ALCOHOL SCREEN 15 MIN: CPT | Mod: NC

## 2021-03-04 PROCEDURE — 93000 ELECTROCARDIOGRAM COMPLETE: CPT | Mod: 59

## 2021-03-04 PROCEDURE — 99396 PREV VISIT EST AGE 40-64: CPT | Mod: 25

## 2021-03-04 RX ORDER — METHYLPREDNISOLONE 4 MG/1
4 TABLET ORAL
Qty: 1 | Refills: 0 | Status: COMPLETED | COMMUNITY
Start: 2020-02-14 | End: 2021-03-04

## 2021-03-04 NOTE — PLAN
[FreeTextEntry1] : \par - Blood work today \par - Osteopenia: Advised exercise, Vitamin D, and having a calcium rich diet\par - F/u with dermatology \par - Hypothyroidism: c/w medication and f/u with specialist \par - Asthma: stable, c/w specialist follow up \par \par

## 2021-03-04 NOTE — ADDENDUM
[FreeTextEntry1] : I, Suze Roca acting as a scribe for Dr. Merced Haro on Mar 04, 2021  at 1:04 PM\par

## 2021-03-04 NOTE — HISTORY OF PRESENT ILLNESS
[FreeTextEntry1] : CPE [de-identified] : STEPHY is a 65 year female here for CPE. Mood is good. Recieved first round of the Moderna vaccine on 1/27/21. Reports she retired October 30, 2020. Receives Octagam 50g monthly and tolerating well. Follows up with pulmonologist . Notes she had CXR done yesterday. Pt was referred to AI / for extensive testing. She was than referred to pulmonologist  who did extensive testing which revealed she does not have cystic fibrosis.  sent her to GI Dr. Kingsley at [place] where she had esophageal imaging. Imaging showed issues with movement. Had manometry which showed spasms. Plan is to go for the second half of the test to r/o reflux. States she was started on Cymbalta 40 mg 2 months ago by neurologist. Receiving Octagam monthly injections. Plans to have cyst on forehead removed with a plastic surgeon. States her cardiologist has told her, her resting hr runs on the high side. \par Pt leads an active lifestyle and eats a well balance diet.

## 2021-03-04 NOTE — HEALTH RISK ASSESSMENT
[Patient reported PAP Smear was abnormal] : Patient reported PAP Smear was abnormal [Patient reported bone density results were abnormal] : Patient reported bone density results were abnormal [Patient reported colonoscopy was normal] : Patient reported colonoscopy was normal [Patient reported mammogram was normal] : Patient reported mammogram was normal [Good] : ~his/her~  mood as  good [None] : None [] :  [Feels Safe at Home] : Feels safe at home [Fully functional (bathing, dressing, toileting, transferring, walking, feeding)] : Fully functional (bathing, dressing, toileting, transferring, walking, feeding) [Fully functional (using the telephone, shopping, preparing meals, housekeeping, doing laundry, using] : Fully functional and needs no help or supervision to perform IADLs (using the telephone, shopping, preparing meals, housekeeping, doing laundry, using transportation, managing medications and managing finances) [Yes] : Yes [Monthly or less (1 pt)] : Monthly or less (1 point) [1 or 2 (0 pts)] : 1 or 2 (0 points) [Never (0 pts)] : Never (0 points) [No] : In the past 12 months have you used drugs other than those required for medical reasons? No [0] : 2) Feeling down, depressed, or hopeless: Not at all (0) [Retired] : retired [] : No [Audit-CScore] : 1 [[id number]] : 0 [MammogramDate] : 07/20 [PapSmearDate] : 06/20 [BoneDensityDate] : 07/20 [ColonoscopyDate] : 01/19

## 2021-03-08 ENCOUNTER — TRANSCRIPTION ENCOUNTER (OUTPATIENT)
Age: 66
End: 2021-03-08

## 2021-03-08 LAB
25(OH)D3 SERPL-MCNC: 52.1 NG/ML
ALBUMIN SERPL ELPH-MCNC: 4.4 G/DL
ALP BLD-CCNC: 66 U/L
ALT SERPL-CCNC: 14 U/L
ANION GAP SERPL CALC-SCNC: 11 MMOL/L
APPEARANCE: ABNORMAL
AST SERPL-CCNC: 24 U/L
BACTERIA: NEGATIVE
BASOPHILS # BLD AUTO: 0.04 K/UL
BASOPHILS NFR BLD AUTO: 1.1 %
BILIRUB SERPL-MCNC: 0.2 MG/DL
BILIRUBIN URINE: NEGATIVE
BLOOD URINE: NEGATIVE
BUN SERPL-MCNC: 17 MG/DL
CALCIUM SERPL-MCNC: 9.7 MG/DL
CHLORIDE SERPL-SCNC: 101 MMOL/L
CHOLEST SERPL-MCNC: 234 MG/DL
CO2 SERPL-SCNC: 28 MMOL/L
COLOR: YELLOW
CREAT SERPL-MCNC: 0.84 MG/DL
EOSINOPHIL # BLD AUTO: 0.04 K/UL
EOSINOPHIL NFR BLD AUTO: 1.1 %
ESTIMATED AVERAGE GLUCOSE: 114 MG/DL
GLUCOSE QUALITATIVE U: NEGATIVE
GLUCOSE SERPL-MCNC: 86 MG/DL
HBA1C MFR BLD HPLC: 5.6 %
HCT VFR BLD CALC: 40 %
HDLC SERPL-MCNC: 81 MG/DL
HGB BLD-MCNC: 13.1 G/DL
HYALINE CASTS: 1 /LPF
IMM GRANULOCYTES NFR BLD AUTO: 0.3 %
KETONES URINE: NEGATIVE
LDLC SERPL CALC-MCNC: 143 MG/DL
LEUKOCYTE ESTERASE URINE: NEGATIVE
LYMPHOCYTES # BLD AUTO: 1.26 K/UL
LYMPHOCYTES NFR BLD AUTO: 35.4 %
MAN DIFF?: NORMAL
MCHC RBC-ENTMCNC: 29.9 PG
MCHC RBC-ENTMCNC: 32.8 GM/DL
MCV RBC AUTO: 91.3 FL
MICROSCOPIC-UA: NORMAL
MONOCYTES # BLD AUTO: 0.3 K/UL
MONOCYTES NFR BLD AUTO: 8.4 %
NEUTROPHILS # BLD AUTO: 1.91 K/UL
NEUTROPHILS NFR BLD AUTO: 53.7 %
NITRITE URINE: NEGATIVE
NONHDLC SERPL-MCNC: 153 MG/DL
PH URINE: 6.5
PLATELET # BLD AUTO: 248 K/UL
POTASSIUM SERPL-SCNC: 4.8 MMOL/L
PROT SERPL-MCNC: 7.8 G/DL
PROTEIN URINE: ABNORMAL
RBC # BLD: 4.38 M/UL
RBC # FLD: 13.2 %
RED BLOOD CELLS URINE: 1 /HPF
SARS-COV-2 IGG SERPL IA-ACNC: 0.08 INDEX
SARS-COV-2 IGG SERPL QL IA: NEGATIVE
SODIUM SERPL-SCNC: 140 MMOL/L
SPECIFIC GRAVITY URINE: 1.03
SQUAMOUS EPITHELIAL CELLS: >27 /HPF
T4 FREE SERPL-MCNC: 1.1 NG/DL
T4 SERPL-MCNC: 7.7 UG/DL
TRIGL SERPL-MCNC: 53 MG/DL
TSH SERPL-ACNC: 4.23 UIU/ML
URATE SERPL-MCNC: 4.7 MG/DL
UROBILINOGEN URINE: NORMAL
WBC # FLD AUTO: 3.56 K/UL
WHITE BLOOD CELLS URINE: 4 /HPF

## 2021-03-18 ENCOUNTER — APPOINTMENT (OUTPATIENT)
Dept: FAMILY MEDICINE | Facility: CLINIC | Age: 66
End: 2021-03-18
Payer: COMMERCIAL

## 2021-03-18 VITALS
SYSTOLIC BLOOD PRESSURE: 130 MMHG | BODY MASS INDEX: 23.74 KG/M2 | TEMPERATURE: 98.2 F | HEART RATE: 75 BPM | WEIGHT: 134 LBS | HEIGHT: 63 IN | DIASTOLIC BLOOD PRESSURE: 80 MMHG | OXYGEN SATURATION: 98 %

## 2021-03-18 PROCEDURE — 99072 ADDL SUPL MATRL&STAF TM PHE: CPT

## 2021-03-18 PROCEDURE — 99214 OFFICE O/P EST MOD 30 MIN: CPT

## 2021-03-18 RX ORDER — AZELASTINE HYDROCHLORIDE 205.5 UG/1
0.15 SPRAY, METERED NASAL DAILY
Qty: 1 | Refills: 3 | Status: COMPLETED | COMMUNITY
Start: 2020-02-12 | End: 2021-03-18

## 2021-03-18 NOTE — PLAN
[FreeTextEntry1] : cont Xyzal 5mg hs \par cont Singulair 10mg hs \par \par see med orders \par  no rhinorrhea, no sore throat

## 2021-03-18 NOTE — HISTORY OF PRESENT ILLNESS
[FreeTextEntry8] : Pt. c/o headaches/ congestion/ ear pressure x 5 days \par \par 64 yo female c 5 day hx of frontal sinus H/A, B/L ear and facial pressure \par no f/c/s\par no N/V/D no abd pain \par +ve fair appetite \par no sore throat \par +ve PND \par +cough c yellow phlegm \par +ve nasal congestion w/o d/c  \par no rashes \par +ve itchy eyes \par \par Using Saline solution,

## 2021-03-23 RX ORDER — AZELASTINE HYDROCHLORIDE AND FLUTICASONE PROPIONATE 137; 50 UG/1; UG/1
137-50 SPRAY, METERED NASAL
Qty: 1 | Refills: 2 | Status: DISCONTINUED | COMMUNITY
Start: 2021-03-18 | End: 2021-03-23

## 2021-06-24 ENCOUNTER — RESULT CHARGE (OUTPATIENT)
Age: 66
End: 2021-06-24

## 2021-06-24 ENCOUNTER — APPOINTMENT (OUTPATIENT)
Dept: OBGYN | Facility: CLINIC | Age: 66
End: 2021-06-24
Payer: COMMERCIAL

## 2021-06-24 VITALS
HEIGHT: 63 IN | SYSTOLIC BLOOD PRESSURE: 122 MMHG | DIASTOLIC BLOOD PRESSURE: 74 MMHG | TEMPERATURE: 97.7 F | BODY MASS INDEX: 25.16 KG/M2 | WEIGHT: 142 LBS

## 2021-06-24 DIAGNOSIS — Z82.49 FAMILY HISTORY OF ISCHEMIC HEART DISEASE AND OTHER DISEASES OF THE CIRCULATORY SYSTEM: ICD-10-CM

## 2021-06-24 DIAGNOSIS — Z01.419 ENCOUNTER FOR GYNECOLOGICAL EXAMINATION (GENERAL) (ROUTINE) W/OUT ABNORMAL FINDINGS: ICD-10-CM

## 2021-06-24 DIAGNOSIS — N60.19 DIFFUSE CYSTIC MASTOPATHY OF UNSPECIFIED BREAST: ICD-10-CM

## 2021-06-24 PROCEDURE — 82270 OCCULT BLOOD FECES: CPT

## 2021-06-24 PROCEDURE — 99072 ADDL SUPL MATRL&STAF TM PHE: CPT

## 2021-06-24 PROCEDURE — 99396 PREV VISIT EST AGE 40-64: CPT

## 2021-06-24 RX ORDER — METHYLPREDNISOLONE 4 MG/1
4 TABLET ORAL
Qty: 1 | Refills: 0 | Status: DISCONTINUED | COMMUNITY
Start: 2021-03-18 | End: 2021-06-24

## 2021-06-24 RX ORDER — LEVOTHYROXINE SODIUM 25 UG/1
25 TABLET ORAL
Qty: 90 | Refills: 0 | Status: DISCONTINUED | COMMUNITY
Start: 2019-05-01 | End: 2021-06-24

## 2021-06-24 RX ORDER — AZITHROMYCIN 250 MG/1
250 TABLET, FILM COATED ORAL
Qty: 1 | Refills: 0 | Status: DISCONTINUED | COMMUNITY
Start: 2021-03-18 | End: 2021-06-24

## 2021-06-26 NOTE — END OF VISIT
[FreeTextEntry3] : I, Allison Alamo, solely acted as a scribe for Dr. Cristobal Mccracken on 06/24/2021 . All medical entries made by the Scribe were at my, Dr. Mccracken's, direction and personally dictated by me on 06/24/2021. I have reviewed the chart and agree that the record accurately reflects my personal performance of the history, physical exam, assessment and plan. I have also personally directed, reviewed and agreed with the chart.

## 2021-06-26 NOTE — DISCUSSION/SUMMARY
[FreeTextEntry1] : During this visit comprehensive counseling was given regarding the following concerns:\par \par 1 We discussed the need for a proper nutrition and exercise. This includes cardiovascular and pelvic floor exercise. \par \par 2 We discussed the importance of maintaining a proper vaccination schedule and we discussed the utility of the flu vaccine and TDaP. \par \par 3 We discussed the impact of chronic sun exposure and the risk for skin disease as a result. The benefits of total body scan by a Dermatologist was reviewed. \par \par 4 We discussed the need for certain supplements for most people which include vitamin D3, calcium rich foods with limitation on calcium supplementation by tabular form to 600 mg by mouth daily. As part of a bone health program we recommend weight bearing exercises, and some limited sun exposure (10-15 minutes daily) to help convert vitamin D to its active form.\par \par 5 Prescription for mammogram screening and breast sonogram given. Prescription for DEXA studies were given.\par \par 6 Rx Vagifem and Estrace cream sent to her pharmacy. Instructions given. \par \par 7 I counselled patient on female anatomy with the aid of the pelvic model. I recommended trying other positions to help with climax. \par \par She will follow up annually or sooner if needed. \par \par During this visit 20 minutes were spent face-to-face with greater than 50% of the time dedicated to counseling.

## 2021-06-26 NOTE — PHYSICAL EXAM
[Appropriately responsive] : appropriately responsive [Alert] : alert [No Acute Distress] : no acute distress [No Lymphadenopathy] : no lymphadenopathy [Soft] : soft [Non-tender] : non-tender [Non-distended] : non-distended [No HSM] : No HSM [No Lesions] : no lesions [No Mass] : no mass [Oriented x3] : oriented x3 [Examination Of The Breasts] : a normal appearance [No Discharge] : no discharge [No Masses] : no breast masses were palpable [Labia Majora] : normal [Labia Minora] : normal [Atrophy] : atrophy [Dry Mucosa] : dry mucosa [No Bleeding] : There was no active vaginal bleeding [Normal] : normal [Uterine Adnexae] : normal [FreeTextEntry9] : Guaiac negative

## 2021-06-27 LAB
DATE COLLECTED: NORMAL
HEMOCCULT SP1 STL QL: NEGATIVE
HPV HIGH+LOW RISK DNA PNL CVX: NOT DETECTED
QUALITY CONTROL: YES

## 2021-07-09 ENCOUNTER — APPOINTMENT (OUTPATIENT)
Dept: ULTRASOUND IMAGING | Facility: CLINIC | Age: 66
End: 2021-07-09
Payer: COMMERCIAL

## 2021-07-09 ENCOUNTER — RESULT REVIEW (OUTPATIENT)
Age: 66
End: 2021-07-09

## 2021-07-09 ENCOUNTER — OUTPATIENT (OUTPATIENT)
Dept: OUTPATIENT SERVICES | Facility: HOSPITAL | Age: 66
LOS: 1 days | End: 2021-07-09
Payer: COMMERCIAL

## 2021-07-09 ENCOUNTER — APPOINTMENT (OUTPATIENT)
Dept: MAMMOGRAPHY | Facility: CLINIC | Age: 66
End: 2021-07-09
Payer: COMMERCIAL

## 2021-07-09 DIAGNOSIS — Z00.8 ENCOUNTER FOR OTHER GENERAL EXAMINATION: ICD-10-CM

## 2021-07-09 DIAGNOSIS — N60.19 DIFFUSE CYSTIC MASTOPATHY OF UNSPECIFIED BREAST: ICD-10-CM

## 2021-07-09 PROCEDURE — 77063 BREAST TOMOSYNTHESIS BI: CPT | Mod: 26

## 2021-07-09 PROCEDURE — 77067 SCR MAMMO BI INCL CAD: CPT

## 2021-07-09 PROCEDURE — 76641 ULTRASOUND BREAST COMPLETE: CPT | Mod: 26,50

## 2021-07-09 PROCEDURE — 76641 ULTRASOUND BREAST COMPLETE: CPT

## 2021-07-09 PROCEDURE — 77067 SCR MAMMO BI INCL CAD: CPT | Mod: 26

## 2021-07-09 PROCEDURE — 77063 BREAST TOMOSYNTHESIS BI: CPT

## 2021-09-29 LAB — CYTOLOGY CVX/VAG DOC THIN PREP: NORMAL

## 2021-10-04 ENCOUNTER — RX RENEWAL (OUTPATIENT)
Age: 66
End: 2021-10-04

## 2021-10-15 ENCOUNTER — RX RENEWAL (OUTPATIENT)
Age: 66
End: 2021-10-15

## 2022-03-07 ENCOUNTER — NON-APPOINTMENT (OUTPATIENT)
Age: 67
End: 2022-03-07

## 2022-03-07 ENCOUNTER — APPOINTMENT (OUTPATIENT)
Dept: FAMILY MEDICINE | Facility: CLINIC | Age: 67
End: 2022-03-07
Payer: COMMERCIAL

## 2022-03-07 VITALS
TEMPERATURE: 97.8 F | BODY MASS INDEX: 24.63 KG/M2 | OXYGEN SATURATION: 98 % | WEIGHT: 139 LBS | SYSTOLIC BLOOD PRESSURE: 118 MMHG | HEART RATE: 72 BPM | DIASTOLIC BLOOD PRESSURE: 82 MMHG | HEIGHT: 63 IN

## 2022-03-07 DIAGNOSIS — Z87.09 PERSONAL HISTORY OF OTHER DISEASES OF THE RESPIRATORY SYSTEM: ICD-10-CM

## 2022-03-07 DIAGNOSIS — Z87.01 PERSONAL HISTORY OF PNEUMONIA (RECURRENT): ICD-10-CM

## 2022-03-07 DIAGNOSIS — Z78.0 ASYMPTOMATIC MENOPAUSAL STATE: ICD-10-CM

## 2022-03-07 DIAGNOSIS — N95.1 MENOPAUSAL AND FEMALE CLIMACTERIC STATES: ICD-10-CM

## 2022-03-07 DIAGNOSIS — F41.9 ANXIETY DISORDER, UNSPECIFIED: ICD-10-CM

## 2022-03-07 DIAGNOSIS — J18.0 BRONCHOPNEUMONIA, UNSPECIFIED ORGANISM: ICD-10-CM

## 2022-03-07 DIAGNOSIS — N13.30 UNSPECIFIED HYDRONEPHROSIS: ICD-10-CM

## 2022-03-07 DIAGNOSIS — J01.30 ACUTE SPHENOIDAL SINUSITIS, UNSPECIFIED: ICD-10-CM

## 2022-03-07 DIAGNOSIS — Z86.19 PERSONAL HISTORY OF OTHER INFECTIOUS AND PARASITIC DISEASES: ICD-10-CM

## 2022-03-07 DIAGNOSIS — J45.909 UNSPECIFIED ASTHMA, UNCOMPLICATED: ICD-10-CM

## 2022-03-07 PROCEDURE — 99396 PREV VISIT EST AGE 40-64: CPT | Mod: 25

## 2022-03-07 PROCEDURE — 93000 ELECTROCARDIOGRAM COMPLETE: CPT | Mod: 59

## 2022-03-07 PROCEDURE — G0444 DEPRESSION SCREEN ANNUAL: CPT | Mod: NC,59

## 2022-03-07 PROCEDURE — G0442 ANNUAL ALCOHOL SCREEN 15 MIN: CPT | Mod: NC

## 2022-03-07 RX ORDER — DULOXETINE HYDROCHLORIDE 40 MG/1
40 CAPSULE, DELAYED RELEASE PELLETS ORAL
Qty: 1 | Refills: 0 | Status: COMPLETED | COMMUNITY
Start: 2021-03-04 | End: 2022-03-07

## 2022-03-07 RX ORDER — LEVOTHYROXINE SODIUM 0.03 MG/1
25 TABLET ORAL
Qty: 90 | Refills: 0 | Status: ACTIVE | COMMUNITY
Start: 2022-03-07

## 2022-03-07 RX ORDER — AZELASTINE HYDROCHLORIDE 137 UG/1
137 SPRAY, METERED NASAL TWICE DAILY
Qty: 1 | Refills: 3 | Status: COMPLETED | COMMUNITY
Start: 2021-03-23 | End: 2022-03-07

## 2022-03-07 RX ORDER — ESTRADIOL 0.1 MG/G
0.1 CREAM VAGINAL
Qty: 1 | Refills: 2 | Status: COMPLETED | COMMUNITY
Start: 2020-06-16 | End: 2022-03-07

## 2022-03-07 RX ORDER — ESTRADIOL 10 UG/1
10 TABLET, FILM COATED VAGINAL
Qty: 36 | Refills: 3 | Status: COMPLETED | COMMUNITY
Start: 2020-06-16 | End: 2022-03-07

## 2022-03-07 RX ORDER — ESTRADIOL 0.1 MG/G
0.1 CREAM VAGINAL
Qty: 1 | Refills: 2 | Status: COMPLETED | COMMUNITY
Start: 2021-06-24 | End: 2022-03-07

## 2022-03-07 NOTE — ADDENDUM
[FreeTextEntry1] : I, Chapincito Angel acting as a scribe for Dr. Merced Haro on March 7, 2022.\par

## 2022-03-07 NOTE — HISTORY OF PRESENT ILLNESS
[FreeTextEntry1] : CPE\par Pt. c/o little lila on left leg \par  [de-identified] : Lakeshia is a 66 year female here today for CPE. Pt c/o lila on lower left leg. Pt stated she compliantly takes takes Fluticasone Propionate 50 MCG/ACT, Montelukast Sodium 10 MG, Estradiol 10 MCG, Levothyroxine Sodium 25 MCG, and Duloxetine HCl - 30 MG. Pt reported she regularly exercises and denies CP while doing so. Pt reported she works with a  3 x a week. Pt sees pulmonologist Dr. Ramsey every few months. Pt will be f/u with pulmonology in April 2022. Pt reported she last saw dermatology in 2018, results were normal. Pt follows endo Dr. Alexis for thyroid level management. Pt reported she had colonoscopy in 2019 which was normal. Pt stated she receives mammo script from GYN. Pt reported she last saw cardio in 2017. Pt's mother had afib. Pt reported she had broke a left Metacarpal in May 2021. Patient had surgery and recovery went well.\par Pt reported she had contracted COVID-19 in December 2021. Pt reported she had cold like symptoms and had a fever for 1 day.\par Pt reported she had received the booster vaccination before she contracted the virus. Pt reported she received the flu vaccination in October 2021. Pt inquired about Shingles vaccination.

## 2022-03-07 NOTE — HEALTH RISK ASSESSMENT
[Patient reported mammogram was normal] : Patient reported mammogram was normal [Patient reported PAP Smear was normal] : Patient reported PAP Smear was normal [Good] : ~his/her~  mood as  good [2 - 4 times a month (2 pts)] : 2-4 times a month (2 points) [1 or 2 (0 pts)] : 1 or 2 (0 points) [Never (0 pts)] : Never (0 points) [No] : In the past 12 months have you used drugs other than those required for medical reasons? No [0] : 2) Feeling down, depressed, or hopeless: Not at all (0) [PHQ-2 Negative - No further assessment needed] : PHQ-2 Negative - No further assessment needed [None] : None [With Significant Other] : lives with significant other [Retired] : retired [] :  [Feels Safe at Home] : Feels safe at home [Fully functional (bathing, dressing, toileting, transferring, walking, feeding)] : Fully functional (bathing, dressing, toileting, transferring, walking, feeding) [Fully functional (using the telephone, shopping, preparing meals, housekeeping, doing laundry, using] : Fully functional and needs no help or supervision to perform IADLs (using the telephone, shopping, preparing meals, housekeeping, doing laundry, using transportation, managing medications and managing finances) [Audit-CScore] : 2 [YRB3Rzxsf] : 0 [MammogramDate] : 07/21 [PapSmearDate] : 06/21 [ColonoscopyDate] : 03/19 [ColonoscopyComments] : Dr. Christiansen polyp/ hemorrhoids f/u 5 years

## 2022-03-07 NOTE — PLAN
[FreeTextEntry1] : - Labs drawn in office today\par - Start Levothyroxine Sodium 25 MCG Oral Tablet; Take One tablet daily \par - Adv receiving pneumonia shot at 67 \par - Enc to continue exercise regimen \par - Education provided for Shingles vaccination \par - Enc to see Dermatology for yearly skin screening \par - F/u in 1 year or sooner as needed

## 2022-03-07 NOTE — PHYSICAL EXAM
[No Acute Distress] : no acute distress [Well Nourished] : well nourished [Well Developed] : well developed [Well-Appearing] : well-appearing [Normal Sclera/Conjunctiva] : normal sclera/conjunctiva [PERRL] : pupils equal round and reactive to light [EOMI] : extraocular movements intact [Normal Outer Ear/Nose] : the outer ears and nose were normal in appearance [Normal Oropharynx] : the oropharynx was normal [No JVD] : no jugular venous distention [No Lymphadenopathy] : no lymphadenopathy [Supple] : supple [Thyroid Normal, No Nodules] : the thyroid was normal and there were no nodules present [No Respiratory Distress] : no respiratory distress  [No Accessory Muscle Use] : no accessory muscle use [Clear to Auscultation] : lungs were clear to auscultation bilaterally [Normal Rate] : normal rate  [Regular Rhythm] : with a regular rhythm [Normal S1, S2] : normal S1 and S2 [No Murmur] : no murmur heard [No Carotid Bruits] : no carotid bruits [No Abdominal Bruit] : a ~M bruit was not heard ~T in the abdomen [No Varicosities] : no varicosities [Pedal Pulses Present] : the pedal pulses are present [No Edema] : there was no peripheral edema [No Palpable Aorta] : no palpable aorta [No Extremity Clubbing/Cyanosis] : no extremity clubbing/cyanosis [Soft] : abdomen soft [Non Tender] : non-tender [Non-distended] : non-distended [No Masses] : no abdominal mass palpated [No HSM] : no HSM [Normal Bowel Sounds] : normal bowel sounds [Normal Posterior Cervical Nodes] : no posterior cervical lymphadenopathy [Normal Anterior Cervical Nodes] : no anterior cervical lymphadenopathy [No CVA Tenderness] : no CVA  tenderness [No Spinal Tenderness] : no spinal tenderness [No Joint Swelling] : no joint swelling [Grossly Normal Strength/Tone] : grossly normal strength/tone [Coordination Grossly Intact] : coordination grossly intact [No Focal Deficits] : no focal deficits [Normal Gait] : normal gait [Deep Tendon Reflexes (DTR)] : deep tendon reflexes were 2+ and symmetric [Normal Affect] : the affect was normal [Normal Insight/Judgement] : insight and judgment were intact [de-identified] : Scarring LLE

## 2022-03-07 NOTE — END OF VISIT
[FreeTextEntry3] : Medical record entries made by the scribe today today, were at my direction and personally dictated to them by me, Dr. Merced Haro on March 7, 2022. I have reviewed the chart and agree that the record accurately reflects my personal performance of the history, physical exam, assessment, and plan.\par

## 2022-03-08 LAB
25(OH)D3 SERPL-MCNC: 56.3 NG/ML
ALBUMIN SERPL ELPH-MCNC: 4.6 G/DL
ALP BLD-CCNC: 88 U/L
ALT SERPL-CCNC: 21 U/L
ANION GAP SERPL CALC-SCNC: 13 MMOL/L
APPEARANCE: CLEAR
AST SERPL-CCNC: 24 U/L
BACTERIA: NEGATIVE
BASOPHILS # BLD AUTO: 0.04 K/UL
BASOPHILS NFR BLD AUTO: 1.1 %
BILIRUB SERPL-MCNC: 0.2 MG/DL
BILIRUBIN URINE: NEGATIVE
BLOOD URINE: NEGATIVE
BUN SERPL-MCNC: 19 MG/DL
CALCIUM SERPL-MCNC: 9.8 MG/DL
CHLORIDE SERPL-SCNC: 103 MMOL/L
CHOLEST SERPL-MCNC: 283 MG/DL
CO2 SERPL-SCNC: 26 MMOL/L
COLOR: NORMAL
CREAT SERPL-MCNC: 0.65 MG/DL
EGFR: 98 ML/MIN/1.73M2
EOSINOPHIL # BLD AUTO: 0.02 K/UL
EOSINOPHIL NFR BLD AUTO: 0.6 %
ESTIMATED AVERAGE GLUCOSE: 108 MG/DL
GLUCOSE QUALITATIVE U: NEGATIVE
GLUCOSE SERPL-MCNC: 93 MG/DL
HBA1C MFR BLD HPLC: 5.4 %
HCT VFR BLD CALC: 40.6 %
HDLC SERPL-MCNC: 79 MG/DL
HGB BLD-MCNC: 13 G/DL
HYALINE CASTS: 0 /LPF
IMM GRANULOCYTES NFR BLD AUTO: 0.3 %
KETONES URINE: NEGATIVE
LDLC SERPL CALC-MCNC: 187 MG/DL
LEUKOCYTE ESTERASE URINE: NEGATIVE
LYMPHOCYTES # BLD AUTO: 1.35 K/UL
LYMPHOCYTES NFR BLD AUTO: 37.8 %
MAN DIFF?: NORMAL
MCHC RBC-ENTMCNC: 29.2 PG
MCHC RBC-ENTMCNC: 32 GM/DL
MCV RBC AUTO: 91.2 FL
MICROSCOPIC-UA: NORMAL
MONOCYTES # BLD AUTO: 0.23 K/UL
MONOCYTES NFR BLD AUTO: 6.4 %
NEUTROPHILS # BLD AUTO: 1.92 K/UL
NEUTROPHILS NFR BLD AUTO: 53.8 %
NITRITE URINE: NEGATIVE
NONHDLC SERPL-MCNC: 204 MG/DL
PH URINE: 7.5
PLATELET # BLD AUTO: 326 K/UL
POTASSIUM SERPL-SCNC: 4.7 MMOL/L
PROT SERPL-MCNC: 7.4 G/DL
PROTEIN URINE: NORMAL
RBC # BLD: 4.45 M/UL
RBC # FLD: 13.8 %
RED BLOOD CELLS URINE: 1 /HPF
SODIUM SERPL-SCNC: 142 MMOL/L
SPECIFIC GRAVITY URINE: 1.02
SQUAMOUS EPITHELIAL CELLS: 6 /HPF
T4 FREE SERPL-MCNC: 1.2 NG/DL
T4 SERPL-MCNC: 6.7 UG/DL
TRIGL SERPL-MCNC: 85 MG/DL
TSH SERPL-ACNC: 3.39 UIU/ML
URATE SERPL-MCNC: 4.4 MG/DL
UROBILINOGEN URINE: NORMAL
WBC # FLD AUTO: 3.57 K/UL
WHITE BLOOD CELLS URINE: 1 /HPF

## 2022-04-25 ENCOUNTER — RX RENEWAL (OUTPATIENT)
Age: 67
End: 2022-04-25

## 2022-05-03 ENCOUNTER — RX RENEWAL (OUTPATIENT)
Age: 67
End: 2022-05-03

## 2022-05-19 ENCOUNTER — APPOINTMENT (OUTPATIENT)
Dept: OBGYN | Facility: CLINIC | Age: 67
End: 2022-05-19

## 2022-06-27 ENCOUNTER — APPOINTMENT (OUTPATIENT)
Dept: OBGYN | Facility: CLINIC | Age: 67
End: 2022-06-27

## 2022-06-27 VITALS — BODY MASS INDEX: 24.8 KG/M2 | DIASTOLIC BLOOD PRESSURE: 90 MMHG | HEIGHT: 63 IN | SYSTOLIC BLOOD PRESSURE: 140 MMHG

## 2022-06-27 VITALS
SYSTOLIC BLOOD PRESSURE: 158 MMHG | DIASTOLIC BLOOD PRESSURE: 98 MMHG | BODY MASS INDEX: 24.8 KG/M2 | HEIGHT: 63 IN | WEIGHT: 140 LBS

## 2022-06-27 DIAGNOSIS — Z01.818 ENCOUNTER FOR OTHER PREPROCEDURAL EXAMINATION: ICD-10-CM

## 2022-06-27 DIAGNOSIS — Z92.29 PERSONAL HISTORY OF OTHER DRUG THERAPY: ICD-10-CM

## 2022-06-27 DIAGNOSIS — Z13.0 ENCOUNTER FOR SCREENING FOR OTHER SUSPECTED ENDOCRINE DISORDER: ICD-10-CM

## 2022-06-27 DIAGNOSIS — Z78.9 OTHER SPECIFIED HEALTH STATUS: ICD-10-CM

## 2022-06-27 DIAGNOSIS — Z13.228 ENCOUNTER FOR SCREENING FOR OTHER SUSPECTED ENDOCRINE DISORDER: ICD-10-CM

## 2022-06-27 DIAGNOSIS — Z13.228 ENCOUNTER FOR SCREENING FOR OTHER METABOLIC DISORDERS: ICD-10-CM

## 2022-06-27 DIAGNOSIS — R06.02 SHORTNESS OF BREATH: ICD-10-CM

## 2022-06-27 DIAGNOSIS — Z13.29 ENCOUNTER FOR SCREENING FOR OTHER SUSPECTED ENDOCRINE DISORDER: ICD-10-CM

## 2022-06-27 DIAGNOSIS — Z13.6 ENCOUNTER FOR SCREENING FOR CARDIOVASCULAR DISORDERS: ICD-10-CM

## 2022-06-27 DIAGNOSIS — Z78.0 ASYMPTOMATIC MENOPAUSAL STATE: ICD-10-CM

## 2022-06-27 PROCEDURE — 99396 PREV VISIT EST AGE 40-64: CPT

## 2022-06-27 PROCEDURE — 82270 OCCULT BLOOD FECES: CPT

## 2022-07-03 LAB
CYTOLOGY CVX/VAG DOC THIN PREP: NORMAL
DATE COLLECTED: NORMAL
HEMOCCULT SP1 STL QL: NEGATIVE
HPV HIGH+LOW RISK DNA PNL CVX: NOT DETECTED
QUALITY CONTROL: YES

## 2022-07-03 NOTE — PHYSICAL EXAM
[Chaperone Present] : A chaperone was present in the examining room during all aspects of the physical examination [FreeTextEntry1] : Heather Norwood [Appropriately responsive] : appropriately responsive [Alert] : alert [No Acute Distress] : no acute distress [No Lymphadenopathy] : no lymphadenopathy [Soft] : soft [Non-tender] : non-tender [Non-distended] : non-distended [No HSM] : No HSM [No Lesions] : no lesions [No Mass] : no mass [Oriented x3] : oriented x3 [Examination Of The Breasts] : a normal appearance [No Masses] : no breast masses were palpable [Labia Majora] : normal [Labia Minora] : normal [Normal] : normal [Uterine Adnexae] : normal [No Tenderness] : no tenderness [Nl Sphincter Tone] : normal sphincter tone [FreeTextEntry9] : Hemoccult negative

## 2022-07-03 NOTE — HISTORY OF PRESENT ILLNESS
[No] : Patient does not have concerns regarding sex [Currently Active] : currently active [Men] : men [Mammogramdate] : 7/9/21 [TextBox_19] : br-1 [BreastSonogramDate] : 7/9/21 [TextBox_25] : br-1 [PapSmeardate] : 6/21/21 [BoneDensityDate] : 7/8/20 [TextBox_31] : neg [TextBox_37] : osteopenia [ColonoscopyDate] : 3/12/19 [LMPDate] : 2007 [TextBox_6] : 2007 [TextBox_9] : 11 [FreeTextEntry1] : 2007

## 2022-07-06 ENCOUNTER — RX RENEWAL (OUTPATIENT)
Age: 67
End: 2022-07-06

## 2022-07-12 ENCOUNTER — OUTPATIENT (OUTPATIENT)
Dept: OUTPATIENT SERVICES | Facility: HOSPITAL | Age: 67
LOS: 1 days | End: 2022-07-12
Payer: MEDICARE

## 2022-07-12 ENCOUNTER — APPOINTMENT (OUTPATIENT)
Dept: MAMMOGRAPHY | Facility: CLINIC | Age: 67
End: 2022-07-12

## 2022-07-12 ENCOUNTER — RESULT REVIEW (OUTPATIENT)
Age: 67
End: 2022-07-12

## 2022-07-12 ENCOUNTER — APPOINTMENT (OUTPATIENT)
Dept: RADIOLOGY | Facility: CLINIC | Age: 67
End: 2022-07-12

## 2022-07-12 ENCOUNTER — APPOINTMENT (OUTPATIENT)
Dept: ULTRASOUND IMAGING | Facility: CLINIC | Age: 67
End: 2022-07-12

## 2022-07-12 DIAGNOSIS — R92.2 INCONCLUSIVE MAMMOGRAM: ICD-10-CM

## 2022-07-12 DIAGNOSIS — Z13.820 ENCOUNTER FOR SCREENING FOR OSTEOPOROSIS: ICD-10-CM

## 2022-07-12 DIAGNOSIS — D80.1 NONFAMILIAL HYPOGAMMAGLOBULINEMIA: ICD-10-CM

## 2022-07-12 PROCEDURE — 76641 ULTRASOUND BREAST COMPLETE: CPT | Mod: 26,50

## 2022-07-12 PROCEDURE — 77067 SCR MAMMO BI INCL CAD: CPT | Mod: 26

## 2022-07-12 PROCEDURE — 77063 BREAST TOMOSYNTHESIS BI: CPT | Mod: 26

## 2022-07-12 PROCEDURE — 77063 BREAST TOMOSYNTHESIS BI: CPT

## 2022-07-12 PROCEDURE — 77085 DXA BONE DENSITY AXL VRT FX: CPT | Mod: 26

## 2022-07-12 PROCEDURE — 77085 DXA BONE DENSITY AXL VRT FX: CPT

## 2022-07-12 PROCEDURE — 76641 ULTRASOUND BREAST COMPLETE: CPT

## 2022-07-12 PROCEDURE — 77067 SCR MAMMO BI INCL CAD: CPT

## 2022-08-19 ENCOUNTER — NON-APPOINTMENT (OUTPATIENT)
Age: 67
End: 2022-08-19

## 2022-08-30 ENCOUNTER — APPOINTMENT (OUTPATIENT)
Dept: FAMILY MEDICINE | Facility: CLINIC | Age: 67
End: 2022-08-30

## 2022-08-30 VITALS
SYSTOLIC BLOOD PRESSURE: 128 MMHG | WEIGHT: 140 LBS | DIASTOLIC BLOOD PRESSURE: 70 MMHG | BODY MASS INDEX: 24.8 KG/M2 | OXYGEN SATURATION: 96 % | HEART RATE: 91 BPM | HEIGHT: 63 IN | TEMPERATURE: 97.7 F

## 2022-08-30 DIAGNOSIS — M25.511 PAIN IN RIGHT SHOULDER: ICD-10-CM

## 2022-08-30 PROCEDURE — 99214 OFFICE O/P EST MOD 30 MIN: CPT

## 2022-08-30 RX ORDER — FLUTICASONE PROPIONATE 50 UG/1
50 SPRAY, METERED NASAL TWICE DAILY
Qty: 1 | Refills: 3 | Status: COMPLETED | COMMUNITY
Start: 2021-03-23 | End: 2022-08-30

## 2022-08-30 NOTE — PLAN
[FreeTextEntry1] : see med orders \par \par see radiology orders \par MDP x 1 take as directed \par Home PT program education  for shoulder strengthening given to pt

## 2022-08-30 NOTE — HISTORY OF PRESENT ILLNESS
[FreeTextEntry1] : STEPHY is a 67 year female c/o pain and discomfort of right shoulder and upper arm. Patient also requests patches for motion sickness as she is going on a cruise. \par \par  [de-identified] : 66 yo male c nearly 4M of RUE pain.   pt states she may have injured it lifting weights. pt states was doing dumbbell chest flyes.   \par Denies paresthesias in UE B/L  \par Pain is in shoulder lateral aspect  radiating into mid humerus region \par R hand dominant \par ROM and strength decreased secondary to pain   \par pain 9/10 when "sharp" pain occurs  \par Pain level at time of visit 4-5/10 \par Denies use of OTC remedies

## 2022-09-06 ENCOUNTER — RX RENEWAL (OUTPATIENT)
Age: 67
End: 2022-09-06

## 2022-11-22 ENCOUNTER — APPOINTMENT (OUTPATIENT)
Dept: FAMILY MEDICINE | Facility: CLINIC | Age: 67
End: 2022-11-22

## 2022-11-22 VITALS
BODY MASS INDEX: 25.87 KG/M2 | SYSTOLIC BLOOD PRESSURE: 130 MMHG | OXYGEN SATURATION: 97 % | WEIGHT: 146 LBS | HEART RATE: 114 BPM | TEMPERATURE: 97.8 F | HEIGHT: 63 IN | DIASTOLIC BLOOD PRESSURE: 82 MMHG

## 2022-11-22 DIAGNOSIS — J18.1 LOBAR PNEUMONIA, UNSPECIFIED ORGANISM: ICD-10-CM

## 2022-11-22 PROCEDURE — 99214 OFFICE O/P EST MOD 30 MIN: CPT

## 2022-11-22 RX ORDER — METHYLPREDNISOLONE 4 MG/1
4 TABLET ORAL
Qty: 1 | Refills: 0 | Status: COMPLETED | COMMUNITY
Start: 2022-08-30 | End: 2022-11-22

## 2022-11-22 RX ORDER — METHOCARBAMOL 500 MG/1
500 TABLET, FILM COATED ORAL
Qty: 80 | Refills: 0 | Status: COMPLETED | COMMUNITY
Start: 2022-09-29

## 2022-11-22 RX ORDER — AZELASTINE HYDROCHLORIDE 137 UG/1
0.1 SPRAY, METERED NASAL
Qty: 1 | Refills: 3 | Status: COMPLETED | COMMUNITY
Start: 2021-10-04 | End: 2022-11-22

## 2022-11-22 RX ORDER — OXYCODONE AND ACETAMINOPHEN 5; 325 MG/1; MG/1
5-325 TABLET ORAL
Qty: 20 | Refills: 0 | Status: COMPLETED | COMMUNITY
Start: 2022-09-16

## 2022-11-22 NOTE — PHYSICAL EXAM
[No Acute Distress] : no acute distress [Well Nourished] : well nourished [Well Developed] : well developed [Well-Appearing] : well-appearing [EOMI] : extraocular movements intact [No JVD] : no jugular venous distention [No Respiratory Distress] : no respiratory distress  [No Accessory Muscle Use] : no accessory muscle use [Clear to Auscultation] : lungs were clear to auscultation bilaterally [Normal Rate] : normal rate  [Regular Rhythm] : with a regular rhythm [Normal S1, S2] : normal S1 and S2 [No Edema] : there was no peripheral edema [Non-distended] : non-distended [No CVA Tenderness] : no CVA  tenderness [Grossly Normal Strength/Tone] : grossly normal strength/tone [No Rash] : no rash [Coordination Grossly Intact] : coordination grossly intact [No Focal Deficits] : no focal deficits [Normal Gait] : normal gait [Normal Affect] : the affect was normal [Normal Mood] : the mood was normal [Normal Insight/Judgement] : insight and judgment were intact [de-identified] : +ve PND post pharynx, +ve erythematous TM R dull TM L

## 2022-11-22 NOTE — PLAN
[FreeTextEntry1] : aggressive hydration!!!!!!!\par \par see med orders \par \par \par CLOSE MONITORING!!!!!!\par \par improved \par \par cont LT4 25mcg

## 2022-11-22 NOTE — HISTORY OF PRESENT ILLNESS
[Earache (R)] : pain in right ear [___ Weeks ago] :  [unfilled] weeks ago [Congestion] : congestion [Cough] : cough [Sore Throat] : sore throat [FreeTextEntry8] : STEPHY is a 67 year female presents to the office today with c/o sinus pressure and pain. Patient believes she has a sinus infection. Patient states this has been going on for the past week. Patient states that she has also had ear pain, headaches, and pressure in head.\par \par 66 yo female c 1 week hx of  sinus/ear pressure.   symptoms worsened c recent air travel 4 days ago.  \par Denies f/c/s\par no N/V/D no abd pain \par +ve stable appetite  \par resolved sore throat \par +ve "scratchy" throat in am \par +ve PND\par +ve R ear pain \par minimal dry cough  \par +ve yellow nasal d/c \par +ve sick contact  (daughter and grandson) \par intact sense of smell/taste \par \par as above despite daily Flonase 2sp ea nostril qd, daily Ipratropium,  and Nightly Astelin 2sp ea nostril qd \par pt reports daily  intake of Levocetirizine 5mg qd and Montelukast 10mg qd \par

## 2023-03-14 ENCOUNTER — RX RENEWAL (OUTPATIENT)
Age: 68
End: 2023-03-14

## 2023-03-20 ENCOUNTER — APPOINTMENT (OUTPATIENT)
Dept: FAMILY MEDICINE | Facility: CLINIC | Age: 68
End: 2023-03-20
Payer: MEDICARE

## 2023-03-20 VITALS
TEMPERATURE: 98.2 F | HEART RATE: 98 BPM | WEIGHT: 142 LBS | SYSTOLIC BLOOD PRESSURE: 142 MMHG | DIASTOLIC BLOOD PRESSURE: 84 MMHG | BODY MASS INDEX: 25.16 KG/M2 | OXYGEN SATURATION: 98 % | HEIGHT: 63 IN

## 2023-03-20 DIAGNOSIS — D80.1 NONFAMILIAL HYPOGAMMAGLOBULINEMIA: ICD-10-CM

## 2023-03-20 DIAGNOSIS — M79.7 FIBROMYALGIA: ICD-10-CM

## 2023-03-20 DIAGNOSIS — D83.9 COMMON VARIABLE IMMUNODEFICIENCY, UNSPECIFIED: ICD-10-CM

## 2023-03-20 DIAGNOSIS — E78.6 LIPOPROTEIN DEFICIENCY: ICD-10-CM

## 2023-03-20 PROCEDURE — G0402 INITIAL PREVENTIVE EXAM: CPT

## 2023-03-20 PROCEDURE — 93000 ELECTROCARDIOGRAM COMPLETE: CPT | Mod: 59

## 2023-03-20 RX ORDER — DOXYCYCLINE 100 MG/1
100 CAPSULE ORAL TWICE DAILY
Qty: 20 | Refills: 0 | Status: COMPLETED | COMMUNITY
Start: 2022-11-22 | End: 2023-03-20

## 2023-03-20 RX ORDER — PREDNISONE 20 MG/1
20 TABLET ORAL
Qty: 9 | Refills: 0 | Status: COMPLETED | COMMUNITY
Start: 2022-11-22 | End: 2023-03-20

## 2023-03-20 NOTE — HEALTH RISK ASSESSMENT
[Patient reported mammogram was normal] : Patient reported mammogram was normal [Patient reported PAP Smear was normal] : Patient reported PAP Smear was normal [Fully functional (bathing, dressing, toileting, transferring, walking, feeding)] : Fully functional (bathing, dressing, toileting, transferring, walking, feeding) [Fully functional (using the telephone, shopping, preparing meals, housekeeping, doing laundry, using] : Fully functional and needs no help or supervision to perform IADLs (using the telephone, shopping, preparing meals, housekeeping, doing laundry, using transportation, managing medications and managing finances) [Good] : ~his/her~  mood as  good [Yes] : Yes [Monthly or less (1 pt)] : Monthly or less (1 point) [1 or 2 (0 pts)] : 1 or 2 (0 points) [Never (0 pts)] : Never (0 points) [No] : In the past 12 months have you used drugs other than those required for medical reasons? No [No falls in past year] : Patient reported no falls in the past year [0] : 2) Feeling down, depressed, or hopeless: Not at all (0) [PHQ-2 Negative - No further assessment needed] : PHQ-2 Negative - No further assessment needed [None] : None [With Significant Other] : lives with significant other [Retired] : retired [] :  [Feels Safe at Home] : Feels safe at home [Audit-CScore] : 1 [UQF3Cpiek] : 0 [MammogramDate] : 07/22 [MammogramComments] : BI-RAD 1 [PapSmearDate] : 06/22 [BoneDensityDate] : 07/22 [BoneDensityComments] : Osteopenia  [ColonoscopyDate] : 03/19 [ColonoscopyComments] : repeat 5 years- Dr. Christiansen

## 2023-03-20 NOTE — ADDENDUM
[FreeTextEntry1] : I, Chapincito Angel acting as a scribe for Dr. Merced Haro on March 20, 2023.\par

## 2023-03-20 NOTE — ASSESSMENT
[FreeTextEntry1] : # Health Maintenance\par - BP: 134/84, RUE, Sitting \par - Labs drawn in office today\par - Enc to practice healthy eating habits\par - Rec engaging in a consistent physical activity regimen \par - Adv to inquire with hematology about Prevnar 20 vaccination \par \par # Hypothyroid\par - C/w Levothyroxine Sodium 25 MCG\par \par # Hypogammaglobulinemia/CVID\par - Monitored by hematology \par \par # Fibromyalgia\par - Monitored by rheumatology \par \par # Hypolipidemia\par - Labs drawn in office today\par - Pt to f/u with cardio pending abnormal labs

## 2023-03-20 NOTE — HISTORY OF PRESENT ILLNESS
[FreeTextEntry1] : YUKI [de-identified] : Lakeshia is a 67 year female here today for YUKI. Patient's mood is good and overall health seems to be well. She denied any acute complaints today. She compliantly takes Spirivia Respimat 1.25 MCG/ACT, Levothyroxine Sodium 25 MCG, Duloxetine HCl 30 MG, and Ipratropium Bromide 0.06 %. Pt noted she has never taken any cholesterol medications in the past. Pt noted she engages in physical activity regimen with a  2 times a week and likes to walk on her own. She denied any falls in the past year. \par \par Pt confirmed she last saw derm Dr. Cunningham in Septemeber 2022 for yearly skin disease screening. Pt reported she is followed by endo Dr. Alexis for hypothyroidism management. 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 She last saw cardio Dr. Latif in April 2022 for routine workup and underwent a CT Heart Calcium Score with a result of 0. Pt confirmed she last saw pulmonologist Dr. Ramsey in January 2023 and underwent a rx CT Chest. Pt confirmed she is followed by neuro Dr. Roque. She noted she was previously followed by rheum Dr. Berry for fibromyalgia though he will be retiring. Pt has previously been worked up by GI to rule out GERD. She will f/u with GYN Dr. Pedro for annual in June 2023. Her last DEXA screening was in July 2022, results showed osteopenia. Pt last had a colonoscopy with GI Dr. Christiansen in March 2019, results were normal. Her last mammogram was in July 2022. \par \par Pt has received a pneumonia vaccination in January 2018

## 2023-03-20 NOTE — PLAN
[FreeTextEntry1] : - Labs drawn in office today\par - C/w engaging in a consistent physical activity regimen \par - Enc to practice healthy eating habits\par - C/w current medication regimen\par - Enc taking measures to reach adequate level of hydration\par - Adv to prioritize fall prevention\par - Adv to inquire about Prevnar 20 vaccination with Hematology \par - F/ in 1 year or sooner as needed

## 2023-03-20 NOTE — END OF VISIT
[FreeTextEntry3] : Medical record entries made by the scribe today today, were at my direction and personally dictated to them by me, Dr. Merced Haro on March 20, 2023. I have reviewed the chart and agree that the record accurately reflects my personal performance of the history, physical exam, assessment, and plan.\par

## 2023-03-21 LAB
25(OH)D3 SERPL-MCNC: 79.7 NG/ML
ALBUMIN SERPL ELPH-MCNC: 4.3 G/DL
ALP BLD-CCNC: 71 U/L
ALT SERPL-CCNC: 19 U/L
ANION GAP SERPL CALC-SCNC: 13 MMOL/L
APPEARANCE: CLEAR
AST SERPL-CCNC: 23 U/L
BACTERIA: NEGATIVE
BASOPHILS # BLD AUTO: 0.03 K/UL
BASOPHILS NFR BLD AUTO: 0.7 %
BILIRUB SERPL-MCNC: 0.2 MG/DL
BILIRUBIN URINE: NEGATIVE
BLOOD URINE: NEGATIVE
BUN SERPL-MCNC: 14 MG/DL
CALCIUM SERPL-MCNC: 9.7 MG/DL
CHLORIDE SERPL-SCNC: 103 MMOL/L
CHOLEST SERPL-MCNC: 245 MG/DL
CO2 SERPL-SCNC: 25 MMOL/L
COLOR: NORMAL
CREAT SERPL-MCNC: 0.77 MG/DL
EGFR: 86 ML/MIN/1.73M2
EOSINOPHIL # BLD AUTO: 0.04 K/UL
EOSINOPHIL NFR BLD AUTO: 1 %
ESTIMATED AVERAGE GLUCOSE: 117 MG/DL
GLUCOSE QUALITATIVE U: NEGATIVE
GLUCOSE SERPL-MCNC: 97 MG/DL
HBA1C MFR BLD HPLC: 5.7 %
HCT VFR BLD CALC: 41.1 %
HDLC SERPL-MCNC: 69 MG/DL
HGB BLD-MCNC: 13.3 G/DL
HYALINE CASTS: 0 /LPF
IMM GRANULOCYTES NFR BLD AUTO: 0.2 %
KETONES URINE: NEGATIVE
LDLC SERPL CALC-MCNC: 149 MG/DL
LEUKOCYTE ESTERASE URINE: NEGATIVE
LYMPHOCYTES # BLD AUTO: 1.27 K/UL
LYMPHOCYTES NFR BLD AUTO: 30.5 %
MAN DIFF?: NORMAL
MCHC RBC-ENTMCNC: 29.6 PG
MCHC RBC-ENTMCNC: 32.4 GM/DL
MCV RBC AUTO: 91.3 FL
MICROSCOPIC-UA: NORMAL
MONOCYTES # BLD AUTO: 0.3 K/UL
MONOCYTES NFR BLD AUTO: 7.2 %
NEUTROPHILS # BLD AUTO: 2.52 K/UL
NEUTROPHILS NFR BLD AUTO: 60.4 %
NITRITE URINE: NEGATIVE
NONHDLC SERPL-MCNC: 176 MG/DL
PH URINE: 8
PLATELET # BLD AUTO: 257 K/UL
POTASSIUM SERPL-SCNC: 4.9 MMOL/L
PROT SERPL-MCNC: 8 G/DL
PROTEIN URINE: NORMAL
RBC # BLD: 4.5 M/UL
RBC # FLD: 13.1 %
RED BLOOD CELLS URINE: 1 /HPF
SODIUM SERPL-SCNC: 141 MMOL/L
SPECIFIC GRAVITY URINE: 1.01
SQUAMOUS EPITHELIAL CELLS: 7 /HPF
TRIGL SERPL-MCNC: 133 MG/DL
URATE SERPL-MCNC: 4.6 MG/DL
UROBILINOGEN URINE: NORMAL
WBC # FLD AUTO: 4.17 K/UL
WHITE BLOOD CELLS URINE: 0 /HPF

## 2023-04-25 ENCOUNTER — APPOINTMENT (OUTPATIENT)
Dept: FAMILY MEDICINE | Facility: CLINIC | Age: 68
End: 2023-04-25
Payer: MEDICARE

## 2023-04-25 VITALS
HEIGHT: 63 IN | TEMPERATURE: 98.2 F | DIASTOLIC BLOOD PRESSURE: 70 MMHG | WEIGHT: 147 LBS | BODY MASS INDEX: 26.05 KG/M2 | SYSTOLIC BLOOD PRESSURE: 118 MMHG | HEART RATE: 95 BPM | OXYGEN SATURATION: 98 %

## 2023-04-25 PROCEDURE — 99213 OFFICE O/P EST LOW 20 MIN: CPT

## 2023-04-26 ENCOUNTER — APPOINTMENT (OUTPATIENT)
Age: 68
End: 2023-04-26

## 2023-04-26 LAB
RAPID RVP RESULT: NOT DETECTED
SARS-COV-2 RNA PNL RESP NAA+PROBE: NOT DETECTED

## 2023-04-26 NOTE — HISTORY OF PRESENT ILLNESS
[FreeTextEntry8] : 68yo F presents with congestion, cough and sinus pressure x 4 days. Pt reports she is very concerned due to her history of frequent pneumonia and not doing well with viruses. Pt typically prescribed levaquin when she is sick. Pt denies any fevers, chills, SOB or wheezing. Pt reports no sick contacts and reports at home covid test was negative.

## 2023-04-26 NOTE — PLAN
[FreeTextEntry1] : start medrol dose pack\par viral/bacterial swab done\par levaquin sent - DELAY start

## 2023-04-26 NOTE — PHYSICAL EXAM
[Normal] : normal rate, regular rhythm, normal S1 and S2 and no murmur heard [Normal Gait] : normal gait [Normal Affect] : the affect was normal [de-identified] : +PND, yellow/green nasal discharge

## 2023-05-24 ENCOUNTER — APPOINTMENT (OUTPATIENT)
Dept: FAMILY MEDICINE | Facility: CLINIC | Age: 68
End: 2023-05-24
Payer: MEDICARE

## 2023-05-24 VITALS
HEART RATE: 92 BPM | HEIGHT: 63 IN | BODY MASS INDEX: 26.05 KG/M2 | SYSTOLIC BLOOD PRESSURE: 122 MMHG | TEMPERATURE: 98.1 F | OXYGEN SATURATION: 98 % | DIASTOLIC BLOOD PRESSURE: 82 MMHG | WEIGHT: 147 LBS

## 2023-05-24 DIAGNOSIS — J30.9 ALLERGIC RHINITIS, UNSPECIFIED: ICD-10-CM

## 2023-05-24 DIAGNOSIS — B37.0 CANDIDAL STOMATITIS: ICD-10-CM

## 2023-05-24 PROCEDURE — 99213 OFFICE O/P EST LOW 20 MIN: CPT

## 2023-05-24 NOTE — PLAN
[FreeTextEntry1] : viral/bacterial swab done today\par start nystatin swish and swallow\par f/u Friday if no improvement in symptoms

## 2023-05-24 NOTE — PHYSICAL EXAM
[No Lymphadenopathy] : no lymphadenopathy [Normal] : normal rate, regular rhythm, normal S1 and S2 and no murmur heard [Normal Gait] : normal gait [Normal Affect] : the affect was normal [de-identified] : pale nasal turbinates, +white film on tongue

## 2023-05-24 NOTE — HISTORY OF PRESENT ILLNESS
[FreeTextEntry1] : Follow up sinuitis \par Pt. c/o cough/ PND/ congestion/achy  [de-identified] : 66yo F presents 4 weeks post- sinus infection. Pt reports she took steroids, levaquin and felt better for a few days and then symptoms restarted. Pt reports some PND and cough. Pt reports no fevers, chills, SOB, or wheezing. Pt reports she has been outside a lot and has allergies but has taking her Singulair and levocetirizine daily. Pt reports no sick contacts.

## 2023-05-25 ENCOUNTER — APPOINTMENT (OUTPATIENT)
Age: 68
End: 2023-05-25

## 2023-05-25 LAB
RAPID RVP RESULT: NOT DETECTED
SARS-COV-2 RNA PNL RESP NAA+PROBE: NOT DETECTED

## 2023-06-28 ENCOUNTER — APPOINTMENT (OUTPATIENT)
Dept: OBGYN | Facility: CLINIC | Age: 68
End: 2023-06-28
Payer: MEDICARE

## 2023-06-28 VITALS
WEIGHT: 141 LBS | HEIGHT: 63 IN | DIASTOLIC BLOOD PRESSURE: 79 MMHG | SYSTOLIC BLOOD PRESSURE: 133 MMHG | BODY MASS INDEX: 24.98 KG/M2

## 2023-06-28 DIAGNOSIS — B37.31 ACUTE CANDIDIASIS OF VULVA AND VAGINA: ICD-10-CM

## 2023-06-28 DIAGNOSIS — N95.2 POSTMENOPAUSAL ATROPHIC VAGINITIS: ICD-10-CM

## 2023-06-28 DIAGNOSIS — M85.80 OTHER SPECIFIED DISORDERS OF BONE DENSITY AND STRUCTURE, UNSPECIFIED SITE: ICD-10-CM

## 2023-06-28 DIAGNOSIS — R92.2 INCONCLUSIVE MAMMOGRAM: ICD-10-CM

## 2023-06-28 DIAGNOSIS — Z12.39 ENCOUNTER FOR OTHER SCREENING FOR MALIGNANT NEOPLASM OF BREAST: ICD-10-CM

## 2023-06-28 DIAGNOSIS — Z13.820 ENCOUNTER FOR SCREENING FOR OSTEOPOROSIS: ICD-10-CM

## 2023-06-28 DIAGNOSIS — Z12.12 ENCOUNTER FOR SCREENING FOR MALIGNANT NEOPLASM OF RECTUM: ICD-10-CM

## 2023-06-28 DIAGNOSIS — Z12.4 ENCOUNTER FOR SCREENING FOR MALIGNANT NEOPLASM OF CERVIX: ICD-10-CM

## 2023-06-28 PROCEDURE — 99397 PER PM REEVAL EST PAT 65+ YR: CPT | Mod: GY

## 2023-06-28 PROCEDURE — 81003 URINALYSIS AUTO W/O SCOPE: CPT | Mod: QW

## 2023-06-28 PROCEDURE — G0101: CPT

## 2023-06-28 PROCEDURE — 82270 OCCULT BLOOD FECES: CPT

## 2023-06-28 RX ORDER — ESTRADIOL 0.1 MG/G
0.1 CREAM VAGINAL
Qty: 1 | Refills: 3 | Status: ACTIVE | COMMUNITY
Start: 2023-06-28 | End: 1900-01-01

## 2023-06-28 RX ORDER — ESTRADIOL 10 UG/1
10 TABLET, FILM COATED VAGINAL
Qty: 36 | Refills: 3 | Status: ACTIVE | COMMUNITY
Start: 2023-06-28 | End: 1900-01-01

## 2023-06-28 NOTE — PHYSICAL EXAM
[Chaperone Present] : A chaperone was present in the examining room during all aspects of the physical examination [FreeTextEntry1] : Sharlene Glasgow MA

## 2023-06-28 NOTE — HISTORY OF PRESENT ILLNESS
[Menarche Age: ____] : age at menarche was [unfilled] [N] : Patient does not use contraception [Y] : Positive pregnancy history [Currently Active] : currently active [Mammogramdate] : 7/12/22 [TextBox_19] : BR1 [BreastSonogramDate] : 7/12/22 [TextBox_25] : BR1 [PapSmeardate] : 6/27/22 [TextBox_31] : NEG [BoneDensityDate] : 7/12/22 [TextBox_37] : OSTEOPENIA [ColonoscopyDate] : 3/12/19 [HPVDate] : 6/27/22 [HepatitisCDate] : 10/8/18 [LMPDate] : 2007 [PGHxTotal] : 4 [[facility]] : 2 [[facility]] : 2 [PGHxABSpont] : 2 [FreeTextEntry1] : 2007

## 2023-07-14 ENCOUNTER — APPOINTMENT (OUTPATIENT)
Dept: MAMMOGRAPHY | Facility: CLINIC | Age: 68
End: 2023-07-14
Payer: MEDICARE

## 2023-07-14 ENCOUNTER — RESULT REVIEW (OUTPATIENT)
Age: 68
End: 2023-07-14

## 2023-07-14 ENCOUNTER — APPOINTMENT (OUTPATIENT)
Dept: ULTRASOUND IMAGING | Facility: CLINIC | Age: 68
End: 2023-07-14
Payer: MEDICARE

## 2023-07-14 ENCOUNTER — OUTPATIENT (OUTPATIENT)
Dept: OUTPATIENT SERVICES | Facility: HOSPITAL | Age: 68
LOS: 1 days | End: 2023-07-14
Payer: MEDICARE

## 2023-07-14 DIAGNOSIS — B37.31 ACUTE CANDIDIASIS OF VULVA AND VAGINA: ICD-10-CM

## 2023-07-14 DIAGNOSIS — R92.2 INCONCLUSIVE MAMMOGRAM: ICD-10-CM

## 2023-07-14 DIAGNOSIS — Z12.39 ENCOUNTER FOR OTHER SCREENING FOR MALIGNANT NEOPLASM OF BREAST: ICD-10-CM

## 2023-07-14 PROCEDURE — 77067 SCR MAMMO BI INCL CAD: CPT | Mod: 26

## 2023-07-14 PROCEDURE — 76641 ULTRASOUND BREAST COMPLETE: CPT | Mod: 26,50,GY

## 2023-07-14 PROCEDURE — 77067 SCR MAMMO BI INCL CAD: CPT

## 2023-07-14 PROCEDURE — 77063 BREAST TOMOSYNTHESIS BI: CPT | Mod: 26

## 2023-07-14 PROCEDURE — 77063 BREAST TOMOSYNTHESIS BI: CPT

## 2023-07-14 PROCEDURE — 76641 ULTRASOUND BREAST COMPLETE: CPT

## 2023-11-06 ENCOUNTER — RX RENEWAL (OUTPATIENT)
Age: 68
End: 2023-11-06

## 2023-11-26 LAB
BILIRUB UR QL STRIP: NORMAL
CYTOLOGY CVX/VAG DOC THIN PREP: NORMAL
DATE COLLECTED: NORMAL
GLUCOSE UR-MCNC: NORMAL
HCG UR QL: 0.2 EU/DL
HEMOCCULT SP1 STL QL: NEGATIVE
HGB UR QL STRIP.AUTO: NORMAL
HPV HIGH+LOW RISK DNA PNL CVX: NOT DETECTED
KETONES UR-MCNC: NORMAL
LEUKOCYTE ESTERASE UR QL STRIP: NORMAL
NITRITE UR QL STRIP: NORMAL
PH UR STRIP: 7.5
PROT UR STRIP-MCNC: NORMAL
QUALITY CONTROL: YES
SP GR UR STRIP: 1.01

## 2024-02-15 ENCOUNTER — RX RENEWAL (OUTPATIENT)
Age: 69
End: 2024-02-15

## 2024-03-07 ENCOUNTER — RX RENEWAL (OUTPATIENT)
Age: 69
End: 2024-03-07

## 2024-03-15 ENCOUNTER — RX RENEWAL (OUTPATIENT)
Age: 69
End: 2024-03-15

## 2024-03-20 ENCOUNTER — NON-APPOINTMENT (OUTPATIENT)
Age: 69
End: 2024-03-20

## 2024-03-21 ENCOUNTER — MED ADMIN CHARGE (OUTPATIENT)
Age: 69
End: 2024-03-21

## 2024-03-21 ENCOUNTER — APPOINTMENT (OUTPATIENT)
Dept: FAMILY MEDICINE | Facility: CLINIC | Age: 69
End: 2024-03-21
Payer: MEDICARE

## 2024-03-21 VITALS
BODY MASS INDEX: 24.63 KG/M2 | DIASTOLIC BLOOD PRESSURE: 78 MMHG | TEMPERATURE: 97.7 F | SYSTOLIC BLOOD PRESSURE: 124 MMHG | OXYGEN SATURATION: 98 % | HEIGHT: 63 IN | WEIGHT: 139 LBS | HEART RATE: 92 BPM

## 2024-03-21 DIAGNOSIS — T75.3XXA MOTION SICKNESS, INITIAL ENCOUNTER: ICD-10-CM

## 2024-03-21 DIAGNOSIS — D70.9 NEUTROPENIA, UNSPECIFIED: ICD-10-CM

## 2024-03-21 DIAGNOSIS — Z00.00 ENCOUNTER FOR GENERAL ADULT MEDICAL EXAMINATION W/OUT ABNORMAL FINDINGS: ICD-10-CM

## 2024-03-21 DIAGNOSIS — R09.82 POSTNASAL DRIP: ICD-10-CM

## 2024-03-21 DIAGNOSIS — Z23 ENCOUNTER FOR IMMUNIZATION: ICD-10-CM

## 2024-03-21 DIAGNOSIS — E55.9 VITAMIN D DEFICIENCY, UNSPECIFIED: ICD-10-CM

## 2024-03-21 DIAGNOSIS — E03.9 HYPOTHYROIDISM, UNSPECIFIED: ICD-10-CM

## 2024-03-21 PROCEDURE — G0439: CPT

## 2024-03-21 RX ORDER — NYSTATIN 100000 [USP'U]/ML
100000 SUSPENSION ORAL 4 TIMES DAILY
Qty: 1 | Refills: 0 | Status: COMPLETED | COMMUNITY
Start: 2023-05-24 | End: 2024-03-21

## 2024-03-21 RX ORDER — ESTRADIOL 10 UG/1
10 TABLET, FILM COATED VAGINAL
Qty: 36 | Refills: 3 | Status: COMPLETED | COMMUNITY
Start: 2021-06-24 | End: 2024-03-21

## 2024-03-21 RX ORDER — METHYLPREDNISOLONE 4 MG/1
4 TABLET ORAL
Qty: 1 | Refills: 0 | Status: COMPLETED | COMMUNITY
Start: 2023-05-31 | End: 2024-03-21

## 2024-03-21 RX ORDER — BENZONATATE 100 MG/1
100 CAPSULE ORAL
Qty: 30 | Refills: 0 | Status: COMPLETED | COMMUNITY
Start: 2023-05-31 | End: 2024-03-21

## 2024-03-21 RX ORDER — METHYLPREDNISOLONE 4 MG/1
4 TABLET ORAL
Qty: 1 | Refills: 0 | Status: COMPLETED | COMMUNITY
Start: 2023-04-25 | End: 2024-03-21

## 2024-03-21 RX ORDER — IPRATROPIUM BROMIDE 42 UG/1
0.06 SPRAY NASAL 3 TIMES DAILY
Qty: 1 | Refills: 3 | Status: COMPLETED | COMMUNITY
Start: 2021-01-12 | End: 2024-03-21

## 2024-03-21 RX ORDER — GUAIFENESIN AND CODEINE PHOSPHATE 10; 100 MG/5ML; MG/5ML
100-10 SOLUTION ORAL
Qty: 1 | Refills: 0 | Status: COMPLETED | COMMUNITY
Start: 2023-05-31 | End: 2024-03-21

## 2024-03-21 RX ORDER — SCOPOLAMINE 1.5 MG/1
1 PATCH, EXTENDED RELEASE TRANSDERMAL
Qty: 1 | Refills: 0 | Status: ACTIVE | COMMUNITY
Start: 2024-03-21 | End: 1900-01-01

## 2024-03-21 NOTE — HISTORY OF PRESENT ILLNESS
[de-identified] : STEPHY MARTINEZ is a 68-year-old female being seen for an annual wellness visit. [facility] Wellness Exam. Mood is good. Pt is currently taking Duloxetine 30mg, Estradiol 10mcg, Spiriva 1.25mcg, Montelukast 10mg, and Levothyroxine 25mcg. Pt is followed by the cardiologist, Dr. Blackmon, is scheduled for her next visit at the end of May. She is also scheduled for a colonoscopy at the end of May. She is scheduled for a visit with her endocrinologist, Dr. Alexis, on Monday. Pt had fallen while playing pickleball in [place], states her sunglasses had cut the area above her eye. When she had returned home she felt a slight bump. Denies oozing. Denies any other falls or trauma.

## 2024-03-21 NOTE — HEALTH RISK ASSESSMENT
[Patient reported PAP Smear was normal] : Patient reported PAP Smear was normal [Good] : ~his/her~  mood as  good [Yes] : Yes [2 - 4 times a month (2 pts)] : 2-4 times a month (2 points) [1 or 2 (0 pts)] : 1 or 2 (0 points) [Never (0 pts)] : Never (0 points) [Never] : Never [No] : In the past 12 months have you used drugs other than those required for medical reasons? No [Any fall with injury in past year] : Patient reported fall with injury in the past year [0] : 2) Feeling down, depressed, or hopeless: Not at all (0) [PHQ-2 Negative - No further assessment needed] : PHQ-2 Negative - No further assessment needed [None] : None [With Family] : lives with family [Retired] : retired [Feels Safe at Home] : Feels safe at home [] :  [Fully functional (bathing, dressing, toileting, transferring, walking, feeding)] : Fully functional (bathing, dressing, toileting, transferring, walking, feeding) [Fully functional (using the telephone, shopping, preparing meals, housekeeping, doing laundry, using] : Fully functional and needs no help or supervision to perform IADLs (using the telephone, shopping, preparing meals, housekeeping, doing laundry, using transportation, managing medications and managing finances) [Smoke Detector] : smoke detector [Audit-CScore] : 2 [de-identified] : Goes on walks daily and exercises regularly.  [de-identified] : Has joined No and followed proper dietary standards. [LWY3Vyddm] : 0 [Change in mental status noted] : No change in mental status noted [Reports changes in hearing] : Reports no changes in hearing [Reports changes in vision] : Reports no changes in vision [Reports normal functional visual acuity (ie: able to read med bottle)] : Reports poor functional visual acuity.  [Reports changes in dental health] : Reports no changes in dental health [MammogramDate] : 07/23 [PapSmearDate] : 06/23 [BoneDensityDate] : 07/22 [ColonoscopyDate] : 03/19 [ColonoscopyComments] : Repeat in 5 years

## 2024-03-21 NOTE — PLAN
[FreeTextEntry1] : -General bloodwork done.  -Discussed the covid vaccine, informed the patient of the risks and benefits associated with the vaccine as well as the risks that come with the covid virus. Recommend the patient come in for treatment ASAP if she tests positive for covid or experiences a sudden cough / headaches. Recommended she take 3 weeks prior to her cruise.  -Discussed the Prevnar 20 vaccine, informed the patient of the potential risks and side effects of the vaccine as well as risks associated with pneumonia. Shot administered.  -Discussed the shingles vaccine, informed the patient of the intense pain the rash causes as well as the side effects of the vaccine including slight fatigue. Recommended the patient come in immediately for treatment in the event of a shingles like rash.  -Recommended the patient follow-up with a dermatologist if she seeks further treatment of the Keloid.  -Will renew the patients Duloxetine 30mg and Azelastine inhaler.  -Will start the patient on Scopolamine 1mg patches for the 15-day cruise.  -Will follow-up with the patient in 1 year or as needed.

## 2024-03-21 NOTE — REASON FOR VISIT
[Annual Wellness Visit] : an annual wellness visit [FreeTextEntry1] : [facility] Wellness Exam

## 2024-03-21 NOTE — ADDENDUM
[FreeTextEntry1] : This note was written by Angie Ott, acting as the  for Dr. Haro. This note accurately reflects the work and decisions made by Dr. Haro.

## 2024-03-27 ENCOUNTER — APPOINTMENT (OUTPATIENT)
Dept: FAMILY MEDICINE | Facility: CLINIC | Age: 69
End: 2024-03-27
Payer: MEDICARE

## 2024-03-27 VITALS
BODY MASS INDEX: 24.27 KG/M2 | OXYGEN SATURATION: 96 % | TEMPERATURE: 97.8 F | HEART RATE: 113 BPM | SYSTOLIC BLOOD PRESSURE: 122 MMHG | HEIGHT: 63 IN | WEIGHT: 137 LBS | DIASTOLIC BLOOD PRESSURE: 72 MMHG

## 2024-03-27 VITALS — HEART RATE: 84 BPM

## 2024-03-27 DIAGNOSIS — J06.9 ACUTE UPPER RESPIRATORY INFECTION, UNSPECIFIED: ICD-10-CM

## 2024-03-27 DIAGNOSIS — R05.9 COUGH, UNSPECIFIED: ICD-10-CM

## 2024-03-27 LAB
25(OH)D3 SERPL-MCNC: 88 NG/ML
ALBUMIN SERPL ELPH-MCNC: 4.4 G/DL
ALP BLD-CCNC: 72 U/L
ALT SERPL-CCNC: 20 U/L
ANION GAP SERPL CALC-SCNC: 12 MMOL/L
APPEARANCE: ABNORMAL
AST SERPL-CCNC: 22 U/L
BACTERIA: NEGATIVE /HPF
BASOPHILS # BLD AUTO: 0.04 K/UL
BASOPHILS NFR BLD AUTO: 0.6 %
BILIRUB SERPL-MCNC: 0.2 MG/DL
BILIRUBIN URINE: NEGATIVE
BLOOD URINE: NEGATIVE
BUN SERPL-MCNC: 11 MG/DL
CALCIUM OXALATE CRYSTALS: PRESENT
CALCIUM SERPL-MCNC: 9.9 MG/DL
CAST: NORMAL /LPF
CHLORIDE SERPL-SCNC: 102 MMOL/L
CHOLEST SERPL-MCNC: 256 MG/DL
CO2 SERPL-SCNC: 27 MMOL/L
COLOR: NORMAL
CREAT SERPL-MCNC: 0.74 MG/DL
EGFR: 89 ML/MIN/1.73M2
EOSINOPHIL # BLD AUTO: 0.03 K/UL
EOSINOPHIL NFR BLD AUTO: 0.5 %
EPITHELIAL CELLS: 26 /HPF
ESTIMATED AVERAGE GLUCOSE: 111 MG/DL
GLUCOSE QUALITATIVE U: NEGATIVE MG/DL
GLUCOSE SERPL-MCNC: 92 MG/DL
HBA1C MFR BLD HPLC: 5.5 %
HCT VFR BLD CALC: 39.5 %
HDLC SERPL-MCNC: 84 MG/DL
HGB BLD-MCNC: 12.8 G/DL
IMM GRANULOCYTES NFR BLD AUTO: 0.2 %
KETONES URINE: NEGATIVE MG/DL
LDLC SERPL CALC-MCNC: 155 MG/DL
LEUKOCYTE ESTERASE URINE: ABNORMAL
LYMPHOCYTES # BLD AUTO: 1.37 K/UL
LYMPHOCYTES NFR BLD AUTO: 21.5 %
MAN DIFF?: NORMAL
MCHC RBC-ENTMCNC: 29.6 PG
MCHC RBC-ENTMCNC: 32.4 GM/DL
MCV RBC AUTO: 91.2 FL
MICROSCOPIC-UA: NORMAL
MONOCYTES # BLD AUTO: 0.47 K/UL
MONOCYTES NFR BLD AUTO: 7.4 %
NEUTROPHILS # BLD AUTO: 4.44 K/UL
NEUTROPHILS NFR BLD AUTO: 69.8 %
NITRITE URINE: NEGATIVE
NONHDLC SERPL-MCNC: 171 MG/DL
PH URINE: 7
PLATELET # BLD AUTO: 260 K/UL
POTASSIUM SERPL-SCNC: 4.1 MMOL/L
PROT SERPL-MCNC: 9 G/DL
PROTEIN URINE: 30 MG/DL
RBC # BLD: 4.33 M/UL
RBC # FLD: 14.2 %
RED BLOOD CELLS URINE: NORMAL /HPF
SODIUM SERPL-SCNC: 141 MMOL/L
SPECIFIC GRAVITY URINE: 1.02
T4 FREE SERPL-MCNC: 1.2 NG/DL
TRIGL SERPL-MCNC: 98 MG/DL
TSH SERPL-ACNC: 4.43 UIU/ML
URATE SERPL-MCNC: 4.4 MG/DL
UROBILINOGEN URINE: 0.2 MG/DL
WBC # FLD AUTO: 6.36 K/UL
WHITE BLOOD CELLS URINE: 7 /HPF

## 2024-03-27 PROCEDURE — 99213 OFFICE O/P EST LOW 20 MIN: CPT

## 2024-03-27 RX ORDER — PREDNISONE 20 MG/1
20 TABLET ORAL
Qty: 9 | Refills: 0 | Status: ACTIVE | COMMUNITY
Start: 2024-03-27 | End: 1900-01-01

## 2024-03-27 RX ORDER — BROMPHENIRAMINE MALEATE, PSEUDOEPHEDRINE HYDROCHLORIDE AND DEXTROMETHORPHAN HYDROBROMIDE 2; 10; 30 MG/5ML; MG/5ML; MG/5ML
2-30-10 SYRUP ORAL
Qty: 1 | Refills: 0 | Status: ACTIVE | COMMUNITY
Start: 2024-03-27 | End: 1900-01-01

## 2024-03-27 NOTE — HISTORY OF PRESENT ILLNESS
[FreeTextEntry8] : STEPHY is a 68-year-old female here c/o persistent cough  5d hx of progressive minimally productive cough c pale yellow phlegm, +ve nasal congestion c clear d/c  no fever  +ve PND  +ve disruption of sleep  no N/V/D  +ve decreased appetite  +ve mild sore throat  no ear pain B/L  no rashes   as above despite FLonase and Astelin NS and Levocetirizine 5mg and Singulair 10mg hs

## 2024-03-27 NOTE — PHYSICAL EXAM
[No Acute Distress] : no acute distress [Well Developed] : well developed [Well Nourished] : well nourished [Well-Appearing] : well-appearing [EOMI] : extraocular movements intact [No JVD] : no jugular venous distention [No Respiratory Distress] : no respiratory distress  [Clear to Auscultation] : lungs were clear to auscultation bilaterally [No Accessory Muscle Use] : no accessory muscle use [Normal Rate] : normal rate  [Regular Rhythm] : with a regular rhythm [Normal S1, S2] : normal S1 and S2 [No Edema] : there was no peripheral edema [Non-distended] : non-distended [No CVA Tenderness] : no CVA  tenderness [Grossly Normal Strength/Tone] : grossly normal strength/tone [No Rash] : no rash [Coordination Grossly Intact] : coordination grossly intact [Normal Gait] : normal gait [No Focal Deficits] : no focal deficits [Normal Affect] : the affect was normal [Normal Mood] : the mood was normal [Normal Insight/Judgement] : insight and judgment were intact [de-identified] : +ve PND post pharynx, +ve dull TMs B/L R>>L

## 2024-04-02 LAB — T4 ESOTERIX: 7 UG/DL

## 2024-04-03 ENCOUNTER — APPOINTMENT (OUTPATIENT)
Dept: FAMILY MEDICINE | Facility: CLINIC | Age: 69
End: 2024-04-03
Payer: MEDICARE

## 2024-04-03 VITALS
DIASTOLIC BLOOD PRESSURE: 88 MMHG | WEIGHT: 142 LBS | BODY MASS INDEX: 25.16 KG/M2 | OXYGEN SATURATION: 98 % | SYSTOLIC BLOOD PRESSURE: 144 MMHG | TEMPERATURE: 97.6 F | HEART RATE: 82 BPM | HEIGHT: 63 IN

## 2024-04-03 DIAGNOSIS — J32.9 CHRONIC SINUSITIS, UNSPECIFIED: ICD-10-CM

## 2024-04-03 PROCEDURE — 99213 OFFICE O/P EST LOW 20 MIN: CPT

## 2024-04-03 RX ORDER — LEVOFLOXACIN 500 MG/1
500 TABLET, FILM COATED ORAL DAILY
Qty: 10 | Refills: 0 | Status: COMPLETED | COMMUNITY
Start: 2024-04-03 | End: 2024-04-13

## 2024-04-03 NOTE — REVIEW OF SYSTEMS
[Fatigue] : fatigue [Itching] : itching [Earache] : earache [Nasal Discharge] : nasal discharge [Postnasal Drip] : postnasal drip [Cough] : cough [Headache] : headache [Negative] : Musculoskeletal [Hearing Loss] : no hearing loss [Nosebleed] : no nosebleeds [Sore Throat] : no sore throat [FreeTextEntry4] : ear fullness right ear

## 2024-04-03 NOTE — HISTORY OF PRESENT ILLNESS
[FreeTextEntry1] : Follow up cough and congestion [de-identified] : Patient presents today for persistent cough, patient states she had some improvement with prednisone and bromefed but cough is still present and patient states she is fatigued. Patient endorses loss of appetite, fatigue. Patient states she is able to sleep at night without waking up coughing. Patient endorses feeling dizzy when leaning forward and sinus pressure bilaterally. Patient denies shortness of breath, chest pain, abdominal pain.

## 2024-04-03 NOTE — PLAN
[FreeTextEntry1] : - Patient with 2+ weeks of cough congestion and now sinus pressure - patient with intolerance to azithromycin, will prescribe Levaquin - Reviewed risked associated with Levaquin, no strenuous exercising - Continue with bromefed, singular, flonase - Patient to call office if still symptomatic within 48 hours, will order chest Xray - Follow up pulmonology, Dr. Ramsey, should symptoms persist after antibiotics.

## 2024-04-03 NOTE — PHYSICAL EXAM
[No Respiratory Distress] : no respiratory distress  [Normal Oropharynx] : the oropharynx was normal [No Accessory Muscle Use] : no accessory muscle use [Clear to Auscultation] : lungs were clear to auscultation bilaterally [Normal] : no joint swelling and grossly normal strength and tone [de-identified] : fluid in right ear , frontal and maxillary sinus tenderness R>L [de-identified] : mild bilateral lymphadenopathy

## 2024-04-15 ENCOUNTER — RX RENEWAL (OUTPATIENT)
Age: 69
End: 2024-04-15

## 2024-04-15 RX ORDER — DULOXETINE HYDROCHLORIDE 30 MG/1
30 CAPSULE, DELAYED RELEASE PELLETS ORAL
Qty: 90 | Refills: 1 | Status: ACTIVE | COMMUNITY
Start: 2022-01-13 | End: 1900-01-01

## 2024-04-19 RX ORDER — AZELASTINE HYDROCHLORIDE 137 UG/1
137 SPRAY, METERED NASAL
Qty: 3 | Refills: 0 | Status: ACTIVE | COMMUNITY
Start: 2024-03-21 | End: 1900-01-01

## 2024-04-24 ENCOUNTER — NON-APPOINTMENT (OUTPATIENT)
Age: 69
End: 2024-04-24

## 2024-04-24 ENCOUNTER — APPOINTMENT (OUTPATIENT)
Dept: OBGYN | Facility: CLINIC | Age: 69
End: 2024-04-24

## 2024-04-24 VITALS
HEIGHT: 63 IN | SYSTOLIC BLOOD PRESSURE: 155 MMHG | DIASTOLIC BLOOD PRESSURE: 80 MMHG | BODY MASS INDEX: 25.17 KG/M2 | WEIGHT: 142.06 LBS

## 2024-04-24 DIAGNOSIS — F40.243 FEAR OF FLYING: ICD-10-CM

## 2024-04-24 DIAGNOSIS — B00.1 HERPESVIRAL VESICULAR DERMATITIS: ICD-10-CM

## 2024-04-24 PROCEDURE — 99459 PELVIC EXAMINATION: CPT

## 2024-04-24 PROCEDURE — 81003 URINALYSIS AUTO W/O SCOPE: CPT | Mod: QW

## 2024-04-24 PROCEDURE — 99214 OFFICE O/P EST MOD 30 MIN: CPT

## 2024-04-24 RX ORDER — ALPRAZOLAM 0.5 MG/1
0.5 TABLET ORAL 3 TIMES DAILY
Qty: 30 | Refills: 0 | Status: ACTIVE | COMMUNITY
Start: 2024-04-24 | End: 1900-01-01

## 2024-04-24 RX ORDER — ESTRADIOL 0.1 MG/G
0.1 CREAM VAGINAL
Qty: 1 | Refills: 3 | Status: ACTIVE | COMMUNITY
Start: 2024-04-24 | End: 1900-01-01

## 2024-04-24 RX ORDER — VALACYCLOVIR 1 G/1
1 TABLET, FILM COATED ORAL
Qty: 48 | Refills: 2 | Status: ACTIVE | COMMUNITY
Start: 2024-04-24 | End: 1900-01-01

## 2024-04-26 LAB
BILIRUB UR QL STRIP: NORMAL
GLUCOSE UR-MCNC: NORMAL
HCG UR QL: 0.2 EU/DL
HGB UR QL STRIP.AUTO: NORMAL
KETONES UR-MCNC: NORMAL
LEUKOCYTE ESTERASE UR QL STRIP: NORMAL
NITRITE UR QL STRIP: NORMAL
PH UR STRIP: 6
PROT UR STRIP-MCNC: NORMAL
SP GR UR STRIP: 1.02

## 2024-04-26 NOTE — HISTORY OF PRESENT ILLNESS
[N] : Patient does not use contraception [Y] : Positive pregnancy history [Menarche Age: ____] : age at menarche was [unfilled] [Currently Active] : currently active [Men] : men [Mammogramdate] : 7/14/23 [BreastSonogramDate] : 7/14/23 [PapSmeardate] : 6/28/23 [BoneDensityDate] : 7/12/22 [ColonoscopyDate] : 3/12/19 [HPVDate] : 6/28/23 [LMPDate] : 2007 [PGHxTotal] : 4 [[facility] : 2 [[facility] : 2 [PGHxABSpont] : 2 [FreeTextEntry1] : 2007

## 2024-04-26 NOTE — DISCUSSION/SUMMARY
[FreeTextEntry1] : Vaginal infection: -On exam: Atrophic vaginitis. No sign of infection. -Aptima plus swab collected today. -RX Fluconazole ordered prophylactically as patient will be traveling. Instructions were reviewed.  -RX Estradiol ordered. Instructions were reviewed.   h/o HSV: -RX Valacyclovir ordered. Instructions were reviewed.   -RX Xanax ordered for anxiety with flying.  Instructions were reviewed. Patient advised to increase PO fluids and frequent ambulation while flying.   All questions and concerns were addressed at today's visit.

## 2024-04-26 NOTE — END OF VISIT
[FreeTextEntry3] : I, Fadia Wallace solely acted as a scribe for Dr. Cristobal Mccracken on 04/24/2024 . All medical entries made by the scribe were at my, Dr. Mccracken's, direction and personally dictated by me on 04/24/2024 . I have reviewed the chart and agree that the record accurately reflects my personal performance of the history, physical exam, assessment and plan. I have also personally directed, reviewed, and agreed with the chart.

## 2024-04-26 NOTE — PHYSICAL EXAM
[Chaperone Present] : A chaperone was present in the examining room during all aspects of the physical examination [Appropriately responsive] : appropriately responsive [Alert] : alert [No Acute Distress] : no acute distress [Oriented x3] : oriented x3 [Labia Majora] : normal [Labia Minora] : normal [Atrophy] : atrophy [Dry Mucosa] : dry mucosa [No Bleeding] : There was no active vaginal bleeding [Normal] : normal [Uterine Adnexae] : normal [63065] : A chaperone was present during the pelvic exam. [FreeTextEntry2] : Fadia Wallace MA

## 2024-06-07 ENCOUNTER — APPOINTMENT (OUTPATIENT)
Dept: FAMILY MEDICINE | Facility: CLINIC | Age: 69
End: 2024-06-07
Payer: MEDICARE

## 2024-06-07 VITALS
SYSTOLIC BLOOD PRESSURE: 128 MMHG | DIASTOLIC BLOOD PRESSURE: 80 MMHG | BODY MASS INDEX: 24.63 KG/M2 | OXYGEN SATURATION: 96 % | HEART RATE: 78 BPM | HEIGHT: 63 IN | WEIGHT: 139 LBS | TEMPERATURE: 98.3 F

## 2024-06-07 DIAGNOSIS — R51.9 HEADACHE, UNSPECIFIED: ICD-10-CM

## 2024-06-07 DIAGNOSIS — R29.898 OTHER SYMPTOMS AND SIGNS INVOLVING THE MUSCULOSKELETAL SYSTEM: ICD-10-CM

## 2024-06-07 DIAGNOSIS — J34.89 OTHER SPECIFIED DISORDERS OF NOSE AND NASAL SINUSES: ICD-10-CM

## 2024-06-07 PROCEDURE — G2211 COMPLEX E/M VISIT ADD ON: CPT

## 2024-06-07 PROCEDURE — 99213 OFFICE O/P EST LOW 20 MIN: CPT

## 2024-06-07 RX ORDER — METHYLPREDNISOLONE 4 MG/1
4 TABLET ORAL
Qty: 1 | Refills: 0 | Status: ACTIVE | COMMUNITY
Start: 2024-06-07 | End: 1900-01-01

## 2024-06-07 RX ORDER — NAPROXEN SODIUM 550 MG/1
550 TABLET ORAL EVERY 8 HOURS
Qty: 30 | Refills: 0 | Status: ACTIVE | COMMUNITY
Start: 2024-06-07 | End: 1900-01-01

## 2024-06-07 NOTE — HISTORY OF PRESENT ILLNESS
[FreeTextEntry8] : pt c/o headaches, sinus pressure, and itchy ears for the past week. Patient states she had COVID a few weeks ago after her trip and the headache occurred shortly after. Patient states she has taken Tylenol without relief and Advil with some relief. Patient states she saw an ENT approximately a year ago and had a balloon procedure performed to widen the sinuses to help with drainage and uses a nasal rinse daily. Patient states she participates in balance therapy and has noticed her balance has been off. Patient also endorses jaw clicking but denies tooth pain and states her hearing has been muffled and her ears have been itchy.

## 2024-06-07 NOTE — REVIEW OF SYSTEMS
[Earache] : earache [Hearing Loss] : hearing loss [Headache] : headache [Negative] : Musculoskeletal [Fever] : no fever [Chills] : no chills [Night Sweats] : no night sweats [Recent Change In Weight] : ~T no recent weight change [Discharge] : no discharge [Pain] : no pain [Vision Problems] : no vision problems [Nasal Discharge] : no nasal discharge [Sore Throat] : no sore throat [Postnasal Drip] : no postnasal drip [Chest Pain] : no chest pain [Shortness Of Breath] : no shortness of breath [Wheezing] : no wheezing [Cough] : no cough [FreeTextEntry4] : sinus pressure, muffled hearing, clicking jaw [de-identified] : balance issues

## 2024-06-07 NOTE — PHYSICAL EXAM
[No Acute Distress] : no acute distress [Well Nourished] : well nourished [Well Developed] : well developed [Well-Appearing] : well-appearing [Normal Sclera/Conjunctiva] : normal sclera/conjunctiva [Normal Outer Ear/Nose] : the outer ears and nose were normal in appearance [No Respiratory Distress] : no respiratory distress  [No Accessory Muscle Use] : no accessory muscle use [Clear to Auscultation] : lungs were clear to auscultation bilaterally [Normal Rate] : normal rate  [Regular Rhythm] : with a regular rhythm [Soft] : abdomen soft [Non Tender] : non-tender [Non-distended] : non-distended [Coordination Grossly Intact] : coordination grossly intact [Normal Gait] : normal gait [Normal Affect] : the affect was normal [Normal Insight/Judgement] : insight and judgment were intact [de-identified] : frontal and maxillary tenderness bilaterally, clicking of the temporomandibular joint bilaterally

## 2024-06-20 ENCOUNTER — TRANSCRIPTION ENCOUNTER (OUTPATIENT)
Age: 69
End: 2024-06-20

## 2024-07-03 ENCOUNTER — APPOINTMENT (OUTPATIENT)
Dept: OBGYN | Facility: CLINIC | Age: 69
End: 2024-07-03

## 2024-07-03 VITALS
DIASTOLIC BLOOD PRESSURE: 81 MMHG | BODY MASS INDEX: 25.79 KG/M2 | WEIGHT: 145.56 LBS | SYSTOLIC BLOOD PRESSURE: 159 MMHG | HEIGHT: 63 IN

## 2024-07-03 VITALS — SYSTOLIC BLOOD PRESSURE: 149 MMHG | DIASTOLIC BLOOD PRESSURE: 85 MMHG

## 2024-07-03 DIAGNOSIS — N95.2 POSTMENOPAUSAL ATROPHIC VAGINITIS: ICD-10-CM

## 2024-07-03 DIAGNOSIS — Z12.39 ENCOUNTER FOR OTHER SCREENING FOR MALIGNANT NEOPLASM OF BREAST: ICD-10-CM

## 2024-07-03 DIAGNOSIS — Z78.9 OTHER SPECIFIED HEALTH STATUS: ICD-10-CM

## 2024-07-03 DIAGNOSIS — M85.80 OTHER SPECIFIED DISORDERS OF BONE DENSITY AND STRUCTURE, UNSPECIFIED SITE: ICD-10-CM

## 2024-07-03 DIAGNOSIS — B00.1 HERPESVIRAL VESICULAR DERMATITIS: ICD-10-CM

## 2024-07-03 DIAGNOSIS — E03.9 HYPOTHYROIDISM, UNSPECIFIED: ICD-10-CM

## 2024-07-03 DIAGNOSIS — Z12.4 ENCOUNTER FOR SCREENING FOR MALIGNANT NEOPLASM OF CERVIX: ICD-10-CM

## 2024-07-03 DIAGNOSIS — Z12.12 ENCOUNTER FOR SCREENING FOR MALIGNANT NEOPLASM OF RECTUM: ICD-10-CM

## 2024-07-03 PROCEDURE — 99397 PER PM REEVAL EST PAT 65+ YR: CPT | Mod: GY

## 2024-07-03 PROCEDURE — 82270 OCCULT BLOOD FECES: CPT

## 2024-07-03 PROCEDURE — G0101: CPT

## 2024-07-03 PROCEDURE — 99459 PELVIC EXAMINATION: CPT

## 2024-07-03 RX ORDER — ESTRADIOL 0.1 MG/G
0.1 CREAM VAGINAL
Qty: 2 | Refills: 2 | Status: ACTIVE | COMMUNITY
Start: 2024-07-03 | End: 1900-01-01

## 2024-07-03 RX ORDER — ESTRADIOL 10 UG/1
10 TABLET, FILM COATED VAGINAL
Qty: 36 | Refills: 3 | Status: ACTIVE | COMMUNITY
Start: 2024-07-03 | End: 1900-01-01

## 2024-07-16 ENCOUNTER — RX RENEWAL (OUTPATIENT)
Age: 69
End: 2024-07-16

## 2024-08-03 ENCOUNTER — RESULT REVIEW (OUTPATIENT)
Age: 69
End: 2024-08-03

## 2024-08-03 ENCOUNTER — APPOINTMENT (OUTPATIENT)
Dept: MAMMOGRAPHY | Facility: CLINIC | Age: 69
End: 2024-08-03

## 2024-08-03 ENCOUNTER — OUTPATIENT (OUTPATIENT)
Dept: OUTPATIENT SERVICES | Facility: HOSPITAL | Age: 69
LOS: 1 days | End: 2024-08-03
Payer: MEDICARE

## 2024-08-03 ENCOUNTER — APPOINTMENT (OUTPATIENT)
Dept: ULTRASOUND IMAGING | Facility: CLINIC | Age: 69
End: 2024-08-03

## 2024-08-03 DIAGNOSIS — Z12.39 ENCOUNTER FOR OTHER SCREENING FOR MALIGNANT NEOPLASM OF BREAST: ICD-10-CM

## 2024-08-03 PROCEDURE — 77063 BREAST TOMOSYNTHESIS BI: CPT | Mod: 26

## 2024-08-03 PROCEDURE — 76641 ULTRASOUND BREAST COMPLETE: CPT | Mod: 26,50,GY

## 2024-08-03 PROCEDURE — 77067 SCR MAMMO BI INCL CAD: CPT | Mod: 26

## 2024-08-03 PROCEDURE — 77067 SCR MAMMO BI INCL CAD: CPT

## 2024-08-03 PROCEDURE — 77063 BREAST TOMOSYNTHESIS BI: CPT

## 2024-08-03 PROCEDURE — 76641 ULTRASOUND BREAST COMPLETE: CPT

## 2024-08-07 ENCOUNTER — RX RENEWAL (OUTPATIENT)
Age: 69
End: 2024-08-07

## 2025-02-10 ENCOUNTER — TRANSCRIPTION ENCOUNTER (OUTPATIENT)
Age: 70
End: 2025-02-10

## 2025-03-27 ENCOUNTER — APPOINTMENT (OUTPATIENT)
Dept: FAMILY MEDICINE | Facility: CLINIC | Age: 70
End: 2025-03-27

## 2025-03-31 ENCOUNTER — APPOINTMENT (OUTPATIENT)
Age: 70
End: 2025-03-31
Payer: MEDICARE

## 2025-03-31 ENCOUNTER — OUTPATIENT (OUTPATIENT)
Dept: OUTPATIENT SERVICES | Facility: HOSPITAL | Age: 70
LOS: 1 days | End: 2025-03-31
Payer: MEDICARE

## 2025-03-31 DIAGNOSIS — Z00.8 ENCOUNTER FOR OTHER GENERAL EXAMINATION: ICD-10-CM

## 2025-03-31 DIAGNOSIS — S32.000A WEDGE COMPRESSION FRACTURE OF UNSPECIFIED LUMBAR VERTEBRA, INITIAL ENCOUNTER FOR CLOSED FRACTURE: ICD-10-CM

## 2025-03-31 PROCEDURE — 77080 DXA BONE DENSITY AXIAL: CPT | Mod: 26

## 2025-03-31 PROCEDURE — 77080 DXA BONE DENSITY AXIAL: CPT

## 2025-06-23 NOTE — PHYSICAL EXAM
[No Acute Distress] : no acute distress [Well Nourished] : well nourished [Well Developed] : well developed [Well-Appearing] : well-appearing [EOMI] : extraocular movements intact [Normal Outer Ear/Nose] : the outer ears and nose were normal in appearance [No JVD] : no jugular venous distention [No Respiratory Distress] : no respiratory distress  [No Accessory Muscle Use] : no accessory muscle use [Clear to Auscultation] : lungs were clear to auscultation bilaterally [Normal Rate] : normal rate  [Regular Rhythm] : with a regular rhythm Niecy Ivory) [Normal S1, S2] : normal S1 and S2 [No Carotid Bruits] : no carotid bruits [No Edema] : there was no peripheral edema [Non-distended] : non-distended [No CVA Tenderness] : no CVA  tenderness [No Rash] : no rash [Coordination Grossly Intact] : coordination grossly intact [No Focal Deficits] : no focal deficits [Normal Gait] : normal gait [Normal Affect] : the affect was normal [Normal Mood] : the mood was normal [Normal Insight/Judgement] : insight and judgment were intact [de-identified] : +ve TTP along R shoulder ant/post and medial aspect   decreased ROM R shoulder secondary to pain

## 2025-07-07 ENCOUNTER — APPOINTMENT (OUTPATIENT)
Dept: OBGYN | Facility: CLINIC | Age: 70
End: 2025-07-07

## 2025-07-07 VITALS
BODY MASS INDEX: 23.39 KG/M2 | HEIGHT: 63 IN | DIASTOLIC BLOOD PRESSURE: 79 MMHG | SYSTOLIC BLOOD PRESSURE: 125 MMHG | WEIGHT: 132 LBS

## 2025-07-07 PROBLEM — R92.30 DENSE BREAST TISSUE ON MAMMOGRAM: Status: ACTIVE | Noted: 2022-06-27

## 2025-07-07 PROBLEM — N89.8 VAGINAL DISCHARGE: Status: ACTIVE | Noted: 2025-07-07

## 2025-07-07 PROCEDURE — G0101: CPT

## 2025-07-07 PROCEDURE — 82270 OCCULT BLOOD FECES: CPT

## 2025-07-07 RX ORDER — ESTRADIOL 0.1 MG/G
0.1 CREAM VAGINAL
Qty: 2 | Refills: 2 | Status: ACTIVE | COMMUNITY
Start: 2025-07-07 | End: 1900-01-01

## 2025-07-07 RX ORDER — VALACYCLOVIR 1 G/1
1 TABLET, FILM COATED ORAL
Qty: 48 | Refills: 3 | Status: ACTIVE | COMMUNITY
Start: 2025-07-07 | End: 1900-01-01

## 2025-07-08 LAB — HPV HIGH+LOW RISK DNA PNL CVX: NOT DETECTED

## 2025-07-10 LAB — CYTOLOGY CVX/VAG DOC THIN PREP: ABNORMAL

## 2025-08-05 ENCOUNTER — APPOINTMENT (OUTPATIENT)
Dept: MAMMOGRAPHY | Facility: CLINIC | Age: 70
End: 2025-08-05
Payer: MEDICARE

## 2025-08-05 ENCOUNTER — RESULT REVIEW (OUTPATIENT)
Age: 70
End: 2025-08-05

## 2025-08-05 ENCOUNTER — OUTPATIENT (OUTPATIENT)
Dept: OUTPATIENT SERVICES | Facility: HOSPITAL | Age: 70
LOS: 1 days | End: 2025-08-05
Payer: MEDICARE

## 2025-08-05 ENCOUNTER — APPOINTMENT (OUTPATIENT)
Dept: ULTRASOUND IMAGING | Facility: CLINIC | Age: 70
End: 2025-08-05
Payer: MEDICARE

## 2025-08-05 DIAGNOSIS — Z12.39 ENCOUNTER FOR OTHER SCREENING FOR MALIGNANT NEOPLASM OF BREAST: ICD-10-CM

## 2025-08-05 PROCEDURE — 77063 BREAST TOMOSYNTHESIS BI: CPT

## 2025-08-05 PROCEDURE — 77067 SCR MAMMO BI INCL CAD: CPT | Mod: 26

## 2025-08-05 PROCEDURE — 77063 BREAST TOMOSYNTHESIS BI: CPT | Mod: 26

## 2025-08-05 PROCEDURE — 77067 SCR MAMMO BI INCL CAD: CPT

## 2025-09-17 LAB
DATE COLLECTED: NORMAL
HEMOCCULT SP1 STL QL: NEGATIVE
QUALITY CONTROL: YES